# Patient Record
Sex: MALE | Race: BLACK OR AFRICAN AMERICAN | NOT HISPANIC OR LATINO | Employment: FULL TIME | URBAN - METROPOLITAN AREA
[De-identification: names, ages, dates, MRNs, and addresses within clinical notes are randomized per-mention and may not be internally consistent; named-entity substitution may affect disease eponyms.]

---

## 2020-10-08 ENCOUNTER — HOSPITAL ENCOUNTER (OUTPATIENT)
Facility: CLINIC | Age: 50
Setting detail: OBSERVATION
Discharge: HOME | End: 2020-10-09
Attending: EMERGENCY MEDICINE | Admitting: INTERNAL MEDICINE
Payer: COMMERCIAL

## 2020-10-08 DIAGNOSIS — I16.1 HYPERTENSIVE EMERGENCY: Primary | ICD-10-CM

## 2020-10-08 DIAGNOSIS — I16.0 HYPERTENSIVE URGENCY: ICD-10-CM

## 2020-10-08 DIAGNOSIS — I24.89 DEMAND ISCHEMIA (H): ICD-10-CM

## 2020-10-08 DIAGNOSIS — I21.4 NSTEMI (NON-ST ELEVATED MYOCARDIAL INFARCTION) (H): ICD-10-CM

## 2020-10-08 DIAGNOSIS — I51.7 LVH (LEFT VENTRICULAR HYPERTROPHY): ICD-10-CM

## 2020-10-08 DIAGNOSIS — F10.930 ALCOHOL WITHDRAWAL, UNCOMPLICATED (H): ICD-10-CM

## 2020-10-08 LAB
ALBUMIN SERPL-MCNC: 3.5 G/DL (ref 3.4–5)
ALP SERPL-CCNC: 88 U/L (ref 40–150)
ALT SERPL W P-5'-P-CCNC: 32 U/L (ref 0–70)
ANION GAP SERPL CALCULATED.3IONS-SCNC: 7 MMOL/L (ref 3–14)
AST SERPL W P-5'-P-CCNC: 28 U/L (ref 0–45)
BASOPHILS # BLD AUTO: 0 10E9/L (ref 0–0.2)
BASOPHILS NFR BLD AUTO: 0.2 %
BILIRUB SERPL-MCNC: 0.5 MG/DL (ref 0.2–1.3)
BUN SERPL-MCNC: 12 MG/DL (ref 7–30)
CALCIUM SERPL-MCNC: 8.7 MG/DL (ref 8.5–10.1)
CHLORIDE SERPL-SCNC: 104 MMOL/L (ref 94–109)
CO2 SERPL-SCNC: 24 MMOL/L (ref 20–32)
CREAT SERPL-MCNC: 1.19 MG/DL (ref 0.66–1.25)
DIFFERENTIAL METHOD BLD: NORMAL
EOSINOPHIL # BLD AUTO: 0 10E9/L (ref 0–0.7)
EOSINOPHIL NFR BLD AUTO: 0.6 %
ERYTHROCYTE [DISTWIDTH] IN BLOOD BY AUTOMATED COUNT: 12 % (ref 10–15)
GFR SERPL CREATININE-BSD FRML MDRD: 71 ML/MIN/{1.73_M2}
GLUCOSE SERPL-MCNC: 88 MG/DL (ref 70–99)
HCT VFR BLD AUTO: 41.7 % (ref 40–53)
HGB BLD-MCNC: 13.7 G/DL (ref 13.3–17.7)
IMM GRANULOCYTES # BLD: 0 10E9/L (ref 0–0.4)
IMM GRANULOCYTES NFR BLD: 0.2 %
LABORATORY COMMENT REPORT: ABNORMAL
LYMPHOCYTES # BLD AUTO: 1.4 10E9/L (ref 0.8–5.3)
LYMPHOCYTES NFR BLD AUTO: 26 %
MCH RBC QN AUTO: 29.7 PG (ref 26.5–33)
MCHC RBC AUTO-ENTMCNC: 32.9 G/DL (ref 31.5–36.5)
MCV RBC AUTO: 91 FL (ref 78–100)
MONOCYTES # BLD AUTO: 0.5 10E9/L (ref 0–1.3)
MONOCYTES NFR BLD AUTO: 9.4 %
NEUTROPHILS # BLD AUTO: 3.3 10E9/L (ref 1.6–8.3)
NEUTROPHILS NFR BLD AUTO: 63.6 %
NRBC # BLD AUTO: 0 10*3/UL
NRBC BLD AUTO-RTO: 0 /100
PLATELET # BLD AUTO: 236 10E9/L (ref 150–450)
POTASSIUM SERPL-SCNC: 3.4 MMOL/L (ref 3.4–5.3)
PROT SERPL-MCNC: 7.9 G/DL (ref 6.8–8.8)
RBC # BLD AUTO: 4.61 10E12/L (ref 4.4–5.9)
SARS-COV-2 RNA SPEC QL NAA+PROBE: NORMAL
SARS-COV-2 RNA SPEC QL NAA+PROBE: POSITIVE
SODIUM SERPL-SCNC: 135 MMOL/L (ref 133–144)
SPECIMEN SOURCE: ABNORMAL
SPECIMEN SOURCE: NORMAL
TROPONIN I SERPL-MCNC: 0.11 UG/L (ref 0–0.04)
TROPONIN I SERPL-MCNC: 0.12 UG/L (ref 0–0.04)
TROPONIN I SERPL-MCNC: 0.12 UG/L (ref 0–0.04)
TSH SERPL DL<=0.005 MIU/L-ACNC: 1.31 MU/L (ref 0.4–4)
WBC # BLD AUTO: 5.2 10E9/L (ref 4–11)

## 2020-10-08 PROCEDURE — 99220 PR INITIAL OBSERVATION CARE,LEVEL III: CPT | Performed by: INTERNAL MEDICINE

## 2020-10-08 PROCEDURE — 96376 TX/PRO/DX INJ SAME DRUG ADON: CPT

## 2020-10-08 PROCEDURE — 250N000013 HC RX MED GY IP 250 OP 250 PS 637: Performed by: EMERGENCY MEDICINE

## 2020-10-08 PROCEDURE — 93005 ELECTROCARDIOGRAM TRACING: CPT

## 2020-10-08 PROCEDURE — 80053 COMPREHEN METABOLIC PANEL: CPT | Performed by: EMERGENCY MEDICINE

## 2020-10-08 PROCEDURE — 85025 COMPLETE CBC W/AUTO DIFF WBC: CPT | Performed by: EMERGENCY MEDICINE

## 2020-10-08 PROCEDURE — G0378 HOSPITAL OBSERVATION PER HR: HCPCS

## 2020-10-08 PROCEDURE — C9803 HOPD COVID-19 SPEC COLLECT: HCPCS

## 2020-10-08 PROCEDURE — 84484 ASSAY OF TROPONIN QUANT: CPT | Mod: 91 | Performed by: INTERNAL MEDICINE

## 2020-10-08 PROCEDURE — 84484 ASSAY OF TROPONIN QUANT: CPT | Performed by: EMERGENCY MEDICINE

## 2020-10-08 PROCEDURE — 250N000011 HC RX IP 250 OP 636: Performed by: EMERGENCY MEDICINE

## 2020-10-08 PROCEDURE — 93005 ELECTROCARDIOGRAM TRACING: CPT | Mod: 76

## 2020-10-08 PROCEDURE — 99285 EMERGENCY DEPT VISIT HI MDM: CPT | Mod: 25

## 2020-10-08 PROCEDURE — 36415 COLL VENOUS BLD VENIPUNCTURE: CPT | Performed by: INTERNAL MEDICINE

## 2020-10-08 PROCEDURE — 250N000013 HC RX MED GY IP 250 OP 250 PS 637: Performed by: INTERNAL MEDICINE

## 2020-10-08 PROCEDURE — U0003 INFECTIOUS AGENT DETECTION BY NUCLEIC ACID (DNA OR RNA); SEVERE ACUTE RESPIRATORY SYNDROME CORONAVIRUS 2 (SARS-COV-2) (CORONAVIRUS DISEASE [COVID-19]), AMPLIFIED PROBE TECHNIQUE, MAKING USE OF HIGH THROUGHPUT TECHNOLOGIES AS DESCRIBED BY CMS-2020-01-R: HCPCS | Performed by: EMERGENCY MEDICINE

## 2020-10-08 PROCEDURE — 96374 THER/PROPH/DIAG INJ IV PUSH: CPT

## 2020-10-08 PROCEDURE — 84443 ASSAY THYROID STIM HORMONE: CPT | Performed by: EMERGENCY MEDICINE

## 2020-10-08 RX ORDER — HEPARIN SODIUM 10000 [USP'U]/100ML
12 INJECTION, SOLUTION INTRAVENOUS CONTINUOUS
Status: DISCONTINUED | OUTPATIENT
Start: 2020-10-08 | End: 2020-10-08

## 2020-10-08 RX ORDER — ASPIRIN 325 MG
325 TABLET ORAL ONCE
Status: COMPLETED | OUTPATIENT
Start: 2020-10-08 | End: 2020-10-08

## 2020-10-08 RX ORDER — ONDANSETRON 2 MG/ML
4 INJECTION INTRAMUSCULAR; INTRAVENOUS EVERY 6 HOURS PRN
Status: DISCONTINUED | OUTPATIENT
Start: 2020-10-08 | End: 2020-10-09 | Stop reason: HOSPADM

## 2020-10-08 RX ORDER — POLYETHYLENE GLYCOL 3350 17 G/17G
17 POWDER, FOR SOLUTION ORAL DAILY PRN
Status: DISCONTINUED | OUTPATIENT
Start: 2020-10-08 | End: 2020-10-09 | Stop reason: HOSPADM

## 2020-10-08 RX ORDER — ZOLPIDEM TARTRATE 5 MG/1
5 TABLET ORAL
Status: DISCONTINUED | OUTPATIENT
Start: 2020-10-08 | End: 2020-10-09 | Stop reason: HOSPADM

## 2020-10-08 RX ORDER — HYDRALAZINE HYDROCHLORIDE 20 MG/ML
10 INJECTION INTRAMUSCULAR; INTRAVENOUS ONCE
Status: COMPLETED | OUTPATIENT
Start: 2020-10-08 | End: 2020-10-08

## 2020-10-08 RX ORDER — HYDRALAZINE HYDROCHLORIDE 20 MG/ML
5 INJECTION INTRAMUSCULAR; INTRAVENOUS ONCE
Status: COMPLETED | OUTPATIENT
Start: 2020-10-08 | End: 2020-10-08

## 2020-10-08 RX ORDER — ACETAMINOPHEN 325 MG/1
650 TABLET ORAL EVERY 4 HOURS PRN
Status: DISCONTINUED | OUTPATIENT
Start: 2020-10-08 | End: 2020-10-09 | Stop reason: HOSPADM

## 2020-10-08 RX ORDER — LISINOPRIL 5 MG/1
5 TABLET ORAL 2 TIMES DAILY
Status: DISCONTINUED | OUTPATIENT
Start: 2020-10-08 | End: 2020-10-09 | Stop reason: HOSPADM

## 2020-10-08 RX ORDER — AMLODIPINE BESYLATE 5 MG/1
5 TABLET ORAL ONCE
Status: COMPLETED | OUTPATIENT
Start: 2020-10-08 | End: 2020-10-08

## 2020-10-08 RX ORDER — HYDRALAZINE HYDROCHLORIDE 20 MG/ML
10 INJECTION INTRAMUSCULAR; INTRAVENOUS ONCE
Status: DISCONTINUED | OUTPATIENT
Start: 2020-10-08 | End: 2020-10-08

## 2020-10-08 RX ORDER — HYDROCHLOROTHIAZIDE 12.5 MG/1
25 CAPSULE ORAL DAILY
Status: DISCONTINUED | OUTPATIENT
Start: 2020-10-08 | End: 2020-10-09 | Stop reason: HOSPADM

## 2020-10-08 RX ORDER — ACETAMINOPHEN 650 MG/1
650 SUPPOSITORY RECTAL EVERY 4 HOURS PRN
Status: DISCONTINUED | OUTPATIENT
Start: 2020-10-08 | End: 2020-10-09 | Stop reason: HOSPADM

## 2020-10-08 RX ORDER — NALOXONE HYDROCHLORIDE 0.4 MG/ML
.1-.4 INJECTION, SOLUTION INTRAMUSCULAR; INTRAVENOUS; SUBCUTANEOUS
Status: DISCONTINUED | OUTPATIENT
Start: 2020-10-08 | End: 2020-10-09 | Stop reason: HOSPADM

## 2020-10-08 RX ORDER — ONDANSETRON 4 MG/1
4 TABLET, ORALLY DISINTEGRATING ORAL EVERY 6 HOURS PRN
Status: DISCONTINUED | OUTPATIENT
Start: 2020-10-08 | End: 2020-10-09 | Stop reason: HOSPADM

## 2020-10-08 RX ADMIN — HYDRALAZINE HYDROCHLORIDE 5 MG: 20 INJECTION INTRAMUSCULAR; INTRAVENOUS at 18:43

## 2020-10-08 RX ADMIN — HYDRALAZINE HYDROCHLORIDE 10 MG: 20 INJECTION INTRAMUSCULAR; INTRAVENOUS at 18:03

## 2020-10-08 RX ADMIN — HYDROCHLOROTHIAZIDE 25 MG: 12.5 CAPSULE ORAL at 20:57

## 2020-10-08 RX ADMIN — LISINOPRIL 5 MG: 5 TABLET ORAL at 20:57

## 2020-10-08 RX ADMIN — ASPIRIN 325 MG ORAL TABLET 325 MG: 325 PILL ORAL at 18:03

## 2020-10-08 RX ADMIN — AMLODIPINE BESYLATE 5 MG: 5 TABLET ORAL at 17:20

## 2020-10-08 ASSESSMENT — ENCOUNTER SYMPTOMS
WEAKNESS: 0
BLOOD IN STOOL: 0
SHORTNESS OF BREATH: 0
NERVOUS/ANXIOUS: 1
NUMBNESS: 0

## 2020-10-08 ASSESSMENT — MIFFLIN-ST. JEOR: SCORE: 1807.91

## 2020-10-08 NOTE — ED PROVIDER NOTES
"  History     Chief Complaint:  Hypertension    HPI   Jamel Jasso is a 50 year old male who presents with hypertension. The patient states that he was getting a routine colonoscopy today at [facility] when his blood pressure was taken and he realized that it was unusually high. After his colonoscopy, he states that he presented her immediately for evaluation. In addition, he reports anxiety. He denies any blood in his stool, chest pain, shortness of breath, numbness, weakness, syncope, or any other symptoms.     Cardiac/PE/DVT Risk Factors:  The patient has no history of hypertension, hyperlipidemia, or diabetes. He reports a family history of hypertension. The patient denies any personal or familial history of PE, DVT, or clotting disorder. The patient reports no recent travel, surgery, or other immobilizations.    Allergies:  Penicillins    Medications:    The patient is not currently taking any prescribed medications.    Past Medical History:    History reviewed.  No pertinent past medical history.    Past Surgical History:    History reviewed. No pertinent surgical history.    Family History:    Alcohol/drug abuse - father, mother  Depression - mother  Hepatitis C - mother  Hypertension - mother    Social History:  Smoking status: Former - 6/9/2020  Alcohol use: Yes  Drug use: No  PCP: [facility]  Marital Status:   [2]     Review of Systems   Respiratory: Negative for shortness of breath.    Cardiovascular: Negative for chest pain.        Hypertension - positive   Gastrointestinal: Negative for blood in stool.   Neurological: Negative for syncope, weakness and numbness.   Psychiatric/Behavioral: The patient is nervous/anxious.    All other systems reviewed and are negative.    Physical Exam     Patient Vitals for the past 24 hrs:   BP Temp Temp src Pulse Resp SpO2 Height Weight   10/08/20 2039 (!) 193/106 96.5  F (35.8  C) Oral 101 18 98 % 1.88 m (6' 2\") 87.8 kg (193 lb 9.6 " "oz)   10/08/20 1945 (!) 190/140 -- -- 92 -- 99 % -- --   10/08/20 1930 (!) 186/132 -- -- 94 -- 98 % -- --   10/08/20 1915 (!) 207/130 -- -- 92 -- 98 % -- --   10/08/20 1850 (!) 199/127 -- -- 79 -- 98 % -- --   10/08/20 1845 (!) 208/130 -- -- 79 -- 98 % -- --   10/08/20 1843 (!) 208/130 -- -- -- -- -- -- --   10/08/20 1840 (!) 200/135 -- -- 82 -- 99 % -- --   10/08/20 1835 -- -- -- 79 -- 99 % -- --   10/08/20 1830 (!) 202/133 -- -- 75 -- 97 % -- --   10/08/20 1825 (!) 206/138 -- -- 75 -- 100 % -- --   10/08/20 1822 -- -- -- -- -- -- 1.88 m (6' 2\") --   10/08/20 1820 (!) 216/140 -- -- 73 -- 99 % -- --   10/08/20 1817 -- -- -- -- -- -- -- 88.7 kg (195 lb 8.8 oz)   10/08/20 1815 -- -- -- 72 -- 92 % -- --   10/08/20 1810 (!) 203/144 -- -- 71 -- 96 % -- --   10/08/20 1805 (!) 220/143 -- -- 75 -- 99 % -- --   10/08/20 1803 (!) 195/132 -- -- -- -- -- -- --   10/08/20 1800 -- -- -- 67 -- 100 % -- --   10/08/20 1755 (!) 195/132 -- -- 67 -- 98 % -- --   10/08/20 1750 -- -- -- 64 -- 97 % -- --   10/08/20 1745 -- -- -- 64 -- 94 % -- --   10/08/20 1740 (!) 192/147 -- -- 67 -- 98 % -- --   10/08/20 1735 -- -- -- 66 -- 96 % -- --   10/08/20 1730 -- -- -- 69 -- 97 % -- --   10/08/20 1725 (!) 190/139 -- -- 68 -- 98 % -- --   10/08/20 1720 -- -- -- 70 -- 97 % -- --   10/08/20 1715 (!) 191/137 -- -- 67 -- 96 % -- --   10/08/20 1710 (!) 191/137 -- -- 71 -- 99 % -- --   10/08/20 1705 -- -- -- 71 -- 99 % -- --   10/08/20 1700 -- -- -- 70 -- 95 % -- --   10/08/20 1655 (!) 192/136 -- -- 81 -- 100 % -- --   10/08/20 1650 (!) 201/134 -- -- 68 -- 99 % -- --   10/08/20 1615 (!) 192/129 97.4  F (36.3  C) Temporal 77 20 100 % -- --       Physical Exam  General: Alert, appears well-developed and well-nourished. Cooperative.     In mild distress, anxious  HEENT:  Head:  Atraumatic  Ears:  External ears are normal  Mouth/Throat:  Oropharynx is without erythema or exudate and mucous membranes are moist   Eyes:   Conjunctivae normal and EOM are " normal. No scleral icterus.  CV:  Normal rate, regular rhythm, normal heart sounds and radial pulses are 2+ and symmetric.  No murmur.  Resp:  Breath sounds are clear bilaterally    Non-labored, no retractions or accessory muscle use  GI:  Abdomen is soft, no distension, no tenderness. No rebound or guarding.  No CVA tenderness bilaterally  MS:  Normal range of motion. No edema.    Normal strength in all 4 extremities.     Back atraumatic.    No midline cervical, thoracic, or lumbar tenderness  Skin:  Warm and dry.  No rash or lesions noted.  Neuro: Alert. Normal strength.  GCS: 15  Psych:  Normal mood and affect.    Emergency Department Course     ECG:  Indication: Hypertension  Time: 1644  Vent. Rate 69 bpm. MA interval 212. QRS duration 84. QT/QTc 408/437. P-R-T axis 34 7 90.  Sinus rhythm with 1st degree AV block. Voltage criteria for left ventricular hypertrophy. T wave abnormality, consider lateral ischemia. Abnormal ECG. Read time: 1647     Indication: Hypertension   Time: 1937  Vent. Rate 86 bpm. MA interval 222. QRS duration 80. QT/QTc 368/440. P-R-T axis 57 8 98.  Sinus rhythm with 1st degree AV block. Left ventricular hypertrophy with repolarization abnormality. Abnormal ECG. No significant change compared to EKG dated 10/8/2020. Read time: 1941    Laboratory:  Laboratory findings were communicated with the patient who voiced understanding of the findings.    CBC: AWNL. (WBC 5.2, HGB 13.7, )     CMP: AWNL (Creatinine: 1.19)    1655 Troponin: 0.110    TSH with free T4 reflex: 1.31    COVID-19 Virus PCR: Pending    Interventions:  1720 Norvasc 5 mg PO  1803 Aspirin 325 mg PO  1803 Apresoline 10 mg PO  1843 Apresoline 5 mg PO    Emergency Department Course:  Past medical records, nursing notes, and vitals reviewed.    1641 I performed an exam of the patient as documented above.     EKG obtained in the ED, see results above.     IV was inserted and blood was drawn for laboratory testing, results  above.    1755 I rechecked the patient and discussed the results of his workup thus far.     1842  I consulted with Dr. Gu of the hospitalist services. They are in agreement to accept the patient for admission.    Findings and plan explained to the Patient who consents to admission. Discussed the patient with Dr. Gu, who will admit the patient to an Observation bed for further monitoring, evaluation, and treatment.    I personally reviewed the laboratory results with the Patient and answered all related questions prior to admission.     Impression & Plan   Medical Decision Making:  Jamel Jasso is a 50 year old male who presents asymptomatic with high blood pressure. Patient had a colonoscopy this morning and noted in his paperwork the he had a high blood pressure reading, leading him to present to the emergency department. Patient's blood work is concerning for troponin elevation of 0.110. He has no known prior cardiac history. I did review an EKG from 2013 at an outside clinic and compared to his EKG today. He does have a more prominent J-point elevation and potential T wave abnormalities in the lateral leads concerning for likely LVH, although cannot fully exclude potential new ischemic changes. With the elevation of the troponin in the setting of severe uncontrolled hypertension, we will treat the patient for hypertensive emergency in setting of LVH. I discussed the case with Dr. Gu of the hospitalist services and he preferred to hold on heparin treatment at this time as higher suspicion for LVH and accelerated HTN as cause of troponin leak, particularly with an asymptomatic patient.  We will hold on heparin for now and plan to continue treatment of hypertensive emergency with IV medications as need be.  We will admit for repeat troponin studies as well as ECHO in the morning. Admitted to Dr. Gu under observation status.    Covid-19  Jamel Jasso was evaluated during a global COVID-19 pandemic,  which necessitated consideration that the patient might be at risk for infection with the SARS-CoV-2 virus that causes COVID-19.   Applicable protocols for evaluation were followed during the patient's care.   COVID-19 was considered as part of the patient's evaluation. The plan for testing is:  a test was obtained during this visit.    Diagnosis:    ICD-10-CM    1. Hypertensive emergency  I16.1    2. NSTEMI (non-ST elevated myocardial infarction) (H)  I21.4 Asymptomatic COVID-19 Virus (Coronavirus) by PCR     Troponin I     SARS-CoV-2 COVID-19 Virus (Coronavirus) RT-PCR     SARS-CoV-2 COVID-19 Virus (Coronavirus) RT-PCR Nasopharyngeal     Troponin I   3. LVH (left ventricular hypertrophy)  I51.7    4. Demand ischemia (H)  I24.8        Disposition:  Admitted to Dr. Gu.    Scribe Disclosure:  Rommel MARIO, am serving as a scribe at 4:40 PM on 10/8/2020 to document services personally performed by Aroldo Aguirre MD based on my observations and the provider's statements to me.        Aroldo Aguirre MD  10/08/20 [id number]

## 2020-10-08 NOTE — ED TRIAGE NOTES
Patient states was sent from doctor for high blood pressure - patient had a colonoscopy and BP was 200/ 130. Patient states he does not have a history of high BP. ABC intact alert and no distress.

## 2020-10-09 ENCOUNTER — APPOINTMENT (OUTPATIENT)
Dept: CARDIOLOGY | Facility: CLINIC | Age: 50
End: 2020-10-09
Attending: INTERNAL MEDICINE
Payer: COMMERCIAL

## 2020-10-09 VITALS
HEIGHT: 74 IN | BODY MASS INDEX: 24.85 KG/M2 | WEIGHT: 193.6 LBS | HEART RATE: 70 BPM | TEMPERATURE: 97.7 F | SYSTOLIC BLOOD PRESSURE: 158 MMHG | RESPIRATION RATE: 16 BRPM | OXYGEN SATURATION: 99 % | DIASTOLIC BLOOD PRESSURE: 98 MMHG

## 2020-10-09 LAB
INTERPRETATION ECG - MUSE: NORMAL
INTERPRETATION ECG - MUSE: NORMAL
TROPONIN I SERPL-MCNC: 0.13 UG/L (ref 0–0.04)

## 2020-10-09 PROCEDURE — G0378 HOSPITAL OBSERVATION PER HR: HCPCS

## 2020-10-09 PROCEDURE — 93306 TTE W/DOPPLER COMPLETE: CPT

## 2020-10-09 PROCEDURE — 250N000013 HC RX MED GY IP 250 OP 250 PS 637: Performed by: PHYSICIAN ASSISTANT

## 2020-10-09 PROCEDURE — 99217 PR OBSERVATION CARE DISCHARGE: CPT | Performed by: PHYSICIAN ASSISTANT

## 2020-10-09 PROCEDURE — 84484 ASSAY OF TROPONIN QUANT: CPT | Performed by: INTERNAL MEDICINE

## 2020-10-09 PROCEDURE — 93306 TTE W/DOPPLER COMPLETE: CPT | Mod: 26 | Performed by: INTERNAL MEDICINE

## 2020-10-09 PROCEDURE — 36415 COLL VENOUS BLD VENIPUNCTURE: CPT | Performed by: INTERNAL MEDICINE

## 2020-10-09 PROCEDURE — 250N000013 HC RX MED GY IP 250 OP 250 PS 637: Performed by: INTERNAL MEDICINE

## 2020-10-09 RX ORDER — FOLIC ACID 1 MG/1
1 TABLET ORAL DAILY
Qty: 30 TABLET | Refills: 0 | Status: SHIPPED | OUTPATIENT
Start: 2020-10-09 | End: 2021-09-23

## 2020-10-09 RX ORDER — LANOLIN ALCOHOL/MO/W.PET/CERES
100 CREAM (GRAM) TOPICAL 3 TIMES DAILY
Status: DISCONTINUED | OUTPATIENT
Start: 2020-10-11 | End: 2020-10-09 | Stop reason: HOSPADM

## 2020-10-09 RX ORDER — LANOLIN ALCOHOL/MO/W.PET/CERES
100 CREAM (GRAM) TOPICAL DAILY
Qty: 30 TABLET | Refills: 0 | Status: SHIPPED | OUTPATIENT
Start: 2020-10-17 | End: 2021-09-23

## 2020-10-09 RX ORDER — LORAZEPAM 2 MG/ML
1-2 INJECTION INTRAMUSCULAR EVERY 30 MIN PRN
Status: DISCONTINUED | OUTPATIENT
Start: 2020-10-09 | End: 2020-10-09 | Stop reason: HOSPADM

## 2020-10-09 RX ORDER — LANOLIN ALCOHOL/MO/W.PET/CERES
100 CREAM (GRAM) TOPICAL DAILY
Status: DISCONTINUED | OUTPATIENT
Start: 2020-10-17 | End: 2020-10-09 | Stop reason: HOSPADM

## 2020-10-09 RX ORDER — FOLIC ACID 1 MG/1
1 TABLET ORAL DAILY
Status: DISCONTINUED | OUTPATIENT
Start: 2020-10-09 | End: 2020-10-09 | Stop reason: HOSPADM

## 2020-10-09 RX ORDER — MULTIPLE VITAMINS W/ MINERALS TAB 9MG-400MCG
1 TAB ORAL DAILY
Status: DISCONTINUED | OUTPATIENT
Start: 2020-10-09 | End: 2020-10-09

## 2020-10-09 RX ORDER — LANOLIN ALCOHOL/MO/W.PET/CERES
200 CREAM (GRAM) TOPICAL 3 TIMES DAILY
Status: DISCONTINUED | OUTPATIENT
Start: 2020-10-09 | End: 2020-10-09 | Stop reason: HOSPADM

## 2020-10-09 RX ORDER — AMLODIPINE BESYLATE 10 MG/1
10 TABLET ORAL DAILY
Qty: 30 TABLET | Refills: 0 | Status: SHIPPED | OUTPATIENT
Start: 2020-10-09 | End: 2021-09-23

## 2020-10-09 RX ORDER — LORAZEPAM 1 MG/1
1-2 TABLET ORAL EVERY 30 MIN PRN
Status: DISCONTINUED | OUTPATIENT
Start: 2020-10-09 | End: 2020-10-09 | Stop reason: HOSPADM

## 2020-10-09 RX ORDER — MULTIPLE VITAMINS W/ MINERALS TAB 9MG-400MCG
1 TAB ORAL DAILY
Qty: 30 TABLET | Refills: 0 | Status: SHIPPED | OUTPATIENT
Start: 2020-10-09 | End: 2021-09-23

## 2020-10-09 RX ORDER — GABAPENTIN 300 MG/1
900 CAPSULE ORAL EVERY 8 HOURS
Status: DISCONTINUED | OUTPATIENT
Start: 2020-10-09 | End: 2020-10-09 | Stop reason: HOSPADM

## 2020-10-09 RX ORDER — FLUMAZENIL 0.1 MG/ML
0.2 INJECTION, SOLUTION INTRAVENOUS
Status: DISCONTINUED | OUTPATIENT
Start: 2020-10-09 | End: 2020-10-09 | Stop reason: HOSPADM

## 2020-10-09 RX ORDER — HYDRALAZINE HYDROCHLORIDE 10 MG/1
10 TABLET, FILM COATED ORAL EVERY 8 HOURS PRN
Status: DISCONTINUED | OUTPATIENT
Start: 2020-10-09 | End: 2020-10-09

## 2020-10-09 RX ORDER — METOPROLOL SUCCINATE 25 MG/1
12.5 TABLET, EXTENDED RELEASE ORAL DAILY
Qty: 30 TABLET | Refills: 0 | Status: CANCELLED | OUTPATIENT
Start: 2020-10-10

## 2020-10-09 RX ORDER — LISINOPRIL 5 MG/1
5 TABLET ORAL 2 TIMES DAILY
Qty: 60 TABLET | Refills: 0 | Status: CANCELLED | OUTPATIENT
Start: 2020-10-09 | End: 2020-11-08

## 2020-10-09 RX ORDER — GABAPENTIN 300 MG/1
600 CAPSULE ORAL EVERY 8 HOURS
Status: DISCONTINUED | OUTPATIENT
Start: 2020-10-12 | End: 2020-10-09 | Stop reason: HOSPADM

## 2020-10-09 RX ORDER — FOLIC ACID 1 MG/1
1 TABLET ORAL DAILY
Status: DISCONTINUED | OUTPATIENT
Start: 2020-10-09 | End: 2020-10-09

## 2020-10-09 RX ORDER — LANOLIN ALCOHOL/MO/W.PET/CERES
100 CREAM (GRAM) TOPICAL DAILY
Status: DISCONTINUED | OUTPATIENT
Start: 2020-10-17 | End: 2020-10-09

## 2020-10-09 RX ORDER — HYDRALAZINE HYDROCHLORIDE 10 MG/1
10 TABLET, FILM COATED ORAL EVERY 8 HOURS PRN
Status: DISCONTINUED | OUTPATIENT
Start: 2020-10-09 | End: 2020-10-09 | Stop reason: HOSPADM

## 2020-10-09 RX ORDER — LISINOPRIL 10 MG/1
20 TABLET ORAL DAILY
Qty: 60 TABLET | Refills: 0 | Status: SHIPPED | OUTPATIENT
Start: 2020-10-09 | End: 2021-09-23

## 2020-10-09 RX ORDER — LISINOPRIL 10 MG/1
10 TABLET ORAL ONCE
Status: COMPLETED | OUTPATIENT
Start: 2020-10-09 | End: 2020-10-09

## 2020-10-09 RX ORDER — GABAPENTIN 600 MG/1
1200 TABLET ORAL ONCE
Status: COMPLETED | OUTPATIENT
Start: 2020-10-09 | End: 2020-10-09

## 2020-10-09 RX ORDER — GABAPENTIN 300 MG/1
300 CAPSULE ORAL EVERY 8 HOURS
Status: DISCONTINUED | OUTPATIENT
Start: 2020-10-14 | End: 2020-10-09 | Stop reason: HOSPADM

## 2020-10-09 RX ORDER — LANOLIN ALCOHOL/MO/W.PET/CERES
200 CREAM (GRAM) TOPICAL 3 TIMES DAILY
Status: DISCONTINUED | OUTPATIENT
Start: 2020-10-09 | End: 2020-10-09

## 2020-10-09 RX ORDER — HYDROCHLOROTHIAZIDE 12.5 MG/1
25 CAPSULE ORAL DAILY
Qty: 60 CAPSULE | Refills: 0 | Status: CANCELLED | OUTPATIENT
Start: 2020-10-10 | End: 2020-11-09

## 2020-10-09 RX ORDER — LANOLIN ALCOHOL/MO/W.PET/CERES
100 CREAM (GRAM) TOPICAL 3 TIMES DAILY
Status: DISCONTINUED | OUTPATIENT
Start: 2020-10-11 | End: 2020-10-09

## 2020-10-09 RX ORDER — GABAPENTIN 100 MG/1
100 CAPSULE ORAL EVERY 8 HOURS
Status: DISCONTINUED | OUTPATIENT
Start: 2020-10-16 | End: 2020-10-09 | Stop reason: HOSPADM

## 2020-10-09 RX ORDER — MULTIPLE VITAMINS W/ MINERALS TAB 9MG-400MCG
1 TAB ORAL DAILY
Status: DISCONTINUED | OUTPATIENT
Start: 2020-10-09 | End: 2020-10-09 | Stop reason: HOSPADM

## 2020-10-09 RX ADMIN — LISINOPRIL 10 MG: 10 TABLET ORAL at 15:58

## 2020-10-09 RX ADMIN — FOLIC ACID 1 MG: 1 TABLET ORAL at 13:25

## 2020-10-09 RX ADMIN — MULTIPLE VITAMINS W/ MINERALS TAB 1 TABLET: TAB at 13:25

## 2020-10-09 RX ADMIN — HYDROCHLOROTHIAZIDE 25 MG: 12.5 CAPSULE ORAL at 09:46

## 2020-10-09 RX ADMIN — GABAPENTIN 1200 MG: 600 TABLET, FILM COATED ORAL at 13:24

## 2020-10-09 RX ADMIN — Medication 200 MG: at 13:29

## 2020-10-09 RX ADMIN — METOPROLOL SUCCINATE 12.5 MG: 25 TABLET, EXTENDED RELEASE ORAL at 09:46

## 2020-10-09 RX ADMIN — THIAMINE HCL TAB 100 MG 200 MG: 100 TAB at 13:24

## 2020-10-09 RX ADMIN — HYDRALAZINE HYDROCHLORIDE 10 MG: 10 TABLET, FILM COATED ORAL at 15:58

## 2020-10-09 RX ADMIN — LISINOPRIL 5 MG: 5 TABLET ORAL at 09:46

## 2020-10-09 NOTE — PLAN OF CARE
PRIMARY DIAGNOSIS: Hypertension, NSTEMI  OUTPATIENT/OBSERVATION GOALS TO BE MET BEFORE DISCHARGE:  1. Ruled out acute coronary syndrome (negative or stable Troponin):   Trops trending up 0.11->0.12->0.123>0.127  2. Pain Status: Pain free.  3. Appropriate provocative testing performed: To be done in AM  - Stress Test Procedure: Echo-result pending  - Interpretation of cardiac rhythm per telemetry tech: SR in 70s    4. Cleared by Consultants (if applicable):N/A  5. Return to near baseline physical activity: Yes  Discharge Planner Nurse   Safe discharge environment identified: Yes  Barriers to discharge: Yes       Entered by: Karly Michelle 10/09/2020     Vitals are Temp: 97.7  F (36.5  C) Temp src: Oral BP: (!) 158/98 Pulse: 70   Resp: 16 SpO2: 99 %.  Patient is AxOx4. Up independently in room. Special precautions maintained for positive COVID-19 result. On a 2 gm sodium, no caffeine diet. Denies pain. PIV SL. Denies dizziness, Sob, and nausea. CMS intact. Plan- tele, trend trops, ECHO-completed. Lisinopril and Hydrochlorothiazide started for HTN. Stable and resting in bed. Will continue to monitor and provide supportive cares.      Please review provider order for any additional goals. Nurse to notify provider when observation goals have been met and patient is ready for discharge.

## 2020-10-09 NOTE — DISCHARGE SUMMARY
[facility]    Discharge Summary  Hospitalist    Date of Admission:  10/8/2020  Date of Discharge:  10/9/2020  6:13 PM  Discharging Provider: Betty Mcdaniels PA-C  Date of Service (when I saw the patient): 10/09/20    Discharge Diagnoses   Hypertensive Urgency  Alcohol Withdrawal  Tobacco use  Asymptomatic COVID 19 PCR Positive status   Detectable Troponin    History of Present Illness   Jamel Jasso is an 50 year old male with Pmhx of HTN,, tobacco dependence, who presented with quite elevated with systolic blood pressure of 206 after an outpatient routine colonoscopy. He presented to the ED for evaluation of blood pressure in the absence of symptoms other than anxiety related to his colonoscopy earlier in the day.   The patient normally exercises frequently and this includes bicycling, swimming, and weightlifting.  He does not have any chest pressure, shortness of breath or chest pain when he is doing exercise or activity.  He has no cardiac history beyond a diagnosis of hypertension previously.   On arrival to the ER, vital signs included blood pressure 192/136 with a heart rate of 80.  No fever.  Saturation 100% on room air.    Workup in the ER included labs and EKG.  EKG showed a sinus rhythm with J-point elevation in the leads V2 through V3 consistent with previous EKG.  He also had some T-wave inversions in the lateral leads V5 and V6 that was not seen on previous EKG from 2013.  Lab work included a troponin elevation of his detectable at 0.1, serial troponin's without dramatic peak and fall. Consistent with demand ischemia in the setting of hypertensive urgency with uncontrolled HTN. TSH is normal.  CBC and complete metabolic panel are all normal. COVID 19 PCR testing returned positive on 10/8 in the absence of symptoms or work up that would demonstrate acute illness.   The patient's blood pressures gradually lowered to an improve range with initiated of dual medical therapy with CCB,  "ACEI. TTE 10/9 did not show RWMA but was notable for moderate LVH. Later on in observation stay, he was noted to demonstrate mild alcohol withdrawal symptoms especially in light of abrupt cessation for his planned colonoscopy, and discussion with the patient and his daughter about his prior daily etoh use. His withdrawal symptoms were mild, he declined outpatient resources for cessation during hospitalization. However with his echocardiogram findings would suspect that he likely has uncontrolled HTN despite the secondary contribution of alcohol withdrawal.   He was doing well, with no chest pain, dyspnea, cough or fevers. Discharged with return precautions, recommendations for close primary care follow up, and cardaic stress evaluation once quarantine completed.       Please see admitting H & P  by Aroldo Gu MD, on 10/8/2020 for full details of the encounter.       Pending Results   None.    Code Status   Full Code       Primary Care Physician   [facility]      BP (!) 170/100 (BP Location: Right arm)   Pulse 77   Temp 97.6  F (36.4  C) (Oral)   Resp 16   Ht 1.88 m (6' 2\")   Wt 87.8 kg (193 lb 9.6 oz)   SpO2 98%   BMI 24.86 kg/m      Constitutional: Awake, alert, no apparent distress  Respiratory:  Normal work of breathing. Lungs clear to auscultation bilaterally, no crackles or wheezing.  Cardiovascular: Regular rate and rhythm, normal S1 and S2, and no murmur appreciated.   GI: Bowel sounds present. soft, non-distended, non-tender.   Skin/Integument: Warm, dry. no peripheral edema.  Neuro: No focal deficits. Moving all extremities with normal strength. Coordination and sensation grossly intact. Speech clear.   Psych: Appropriate affect.        Discharge Disposition   Discharged to home  Condition at discharge: Stable    Consultations This Hospital Stay   PHARMACY TO DOSE HEPARIN    Time Spent on this Encounter   Betty MARIO PA-C, personally saw the patient today and spent " greater than 30 minutes discharging this patient.    Discharge Orders      Discharge Order: F/U with Cardiac  JUN      Reason for your hospital stay    You were hospitalized for observation high blood pressure.     Follow-up and recommended labs and tests     Follow up with primary care provider, [facility], within 7 days to evaluate medication change and for hospital follow- up.  The following labs/tests are recommended: BMP.     Activity    Your activity upon discharge: activity as tolerated     Reason for your hospital stay    You were hospitalized for evaluation of high blood pressure after colonoscopy. Found to be positive for COVID 19 infection, however asymptomatic.     Follow-up and recommended labs and tests     Follow up with primary care provider, [facility], within 7 days to evaluate medication change and for hospital follow- up.  The following labs/tests are recommended: BMP.    Follow up with stress test (echocardiogram), ordered with [facility].     Activity    Your activity upon discharge: activity as tolerated     When to contact your care team    Call your primary care doctor if you have any of the following: temperature greater than 101 F, worsening shortness of breath, increased swelling, worsening pain, new or unrelenting diarrhea, or any other concerning symptoms. Call 911 or go to the emergency room if you need immediate assistance.     When to contact your care team    When to seek medical advice  Call your healthcare provider right away if any of these occur:    Blood pressure reaches a systolic (top number) of 180 or higher or diastolic (bottom number) of 110 or higher, despite blood pressure medication    Chest, arm, shoulder, neck, or upper back pain    Shortness of breath    Severe headache    Throbbing or rushing sound in the ears    Nosebleed    Extreme drowsiness, confusion, or fainting    Dizziness or dizziness with spinning sensation  (vertigo)    Weakness in an arm or leg or on one side of the face    Trouble speaking or seeing      Echo Stress Echocardiogram    Administration of IV contrast will be tailored to this examination per the appropriate written protocol listed in the Echocardiography department Protocol Book, or by the supervising Cardiologist. This may result in an order change.    Use of contrast is at the discretion of the supervising Cardiologist.     Diet    Follow this diet upon discharge: Regular     Diet    Follow this diet upon discharge: Regular     Discharge Medications   Discharge Medication List as of 10/9/2020  5:09 PM      START taking these medications    Details   amLODIPine (NORVASC) 10 MG tablet Take 1 tablet (10 mg) by mouth daily Hold for SBP <120, Disp-30 tablet, R-0, E-Prescribe      folic acid (FOLVITE) 1 MG tablet Take 1 tablet (1 mg) by mouth daily, Disp-30 tablet, R-0, E-Prescribe      lisinopril (ZESTRIL) 10 MG tablet Take 2 tablets (20 mg) by mouth daily Hold for SBP <125, Disp-60 tablet, R-0, E-Prescribe      multivitamin w/minerals (THERA-VIT-M) tablet Take 1 tablet by mouth daily, Disp-30 tablet, R-0, E-Prescribe      thiamine (B-1) 100 MG tablet Take 1 tablet (100 mg) by mouth daily, Disp-30 tablet, R-0, E-Prescribe           Allergies   No Known Allergies  Data   Most Recent 3 CBC's:  Recent Labs   Lab Test 10/08/20  1655   WBC 5.2   HGB 13.7   MCV 91         Most Recent 3 BMP's:  Recent Labs   Lab Test 10/08/20  1655      POTASSIUM 3.4   CHLORIDE 104   CO2 24   BUN 12   CR 1.19   ANIONGAP 7   ELDA 8.7   GLC 88     Most Recent 2 LFT's:  Recent Labs   Lab Test 10/08/20  1655   AST 28   ALT 32   ALKPHOS 88   BILITOTAL 0.5       Most Recent 3 Troponin's:  Recent Labs   Lab Test 10/09/20  0651 10/08/20  2144 10/08/20  1942   TROPI 0.127* 0.123* 0.120*   Most Recent TSH, T4 and A1c Labs:  Recent Labs   Lab Test 10/08/20  1655   TSH 1.31     Results for orders placed or performed during the  hospital encounter of 10/08/20   Echocardiogram Complete    Narrative    [id number]  [id number]  [id number]  [id number]^TRISTIAN^LORENA^[facility]  Echocardiography Laboratory  [address] [place] [address]        Name: RANDY HOLLOWAY  MRN: [id number]  : 1970  Study Date: 10/09/2020 07:50 AM  Age: 50 yrs  Gender: Male  Patient Location: [facility]  Reason For Study: Abn EKG  Ordering Physician: LORENA LUBIN  Referring Physician: Sobeida [place]  Performed By: Oral Vizcarra RDCS     BSA: 2.1 m2  Height: 74 in  Weight: 195 lb  HR: 68  BP: 193/106 mmHg  _____________________________________________________________________________  __        Procedure  Complete Portable Echo Adult.  _____________________________________________________________________________  __        Interpretation Summary     There is moderate to severe concentric left ventricular hypertrophy.  The visual ejection fraction is estimated at 55-60%.  There is trace to mild aortic regurgitation.  _____________________________________________________________________________  __        Left Ventricle  The left ventricle is normal in size. There is moderate to severe concentric  left ventricular hypertrophy. The visual ejection fraction is estimated at 55-  60%. Left ventricular diastolic function is indeterminate.     Right Ventricle  The right ventricle is normal in structure, function and size.     Atria  Normal left atrial size. Right atrial size is normal.     Mitral Valve  The mitral valve is normal in structure and function.        Tricuspid Valve  Normal tricuspid valve.     Aortic Valve  There is trivial trileaflet aortic sclerosis. There is trace to mild aortic  regurgitation.     Pulmonic Valve  Normal pulmonic valve.     Vessels  The aortic root is normal size. Normal size ascending aorta. The inferior vena  cava is normal.     Pericardium  There is no pericardial effusion.      _____________________________________________________________________________  __  MMode/2D Measurements & Calculations  IVSd: 1.6 cm  LVIDd: 4.5 cm  LVIDs: 2.8 cm  LVPWd: 1.8 cm  FS: 37.8 %  LV mass(C)d: 327.0 grams  LV mass(C)dI: 152.1 grams/m2     Ao root diam: 3.7 cm  LA dimension: 4.9 cm  asc Aorta Diam: 3.5 cm  LA/Ao: 1.3  LVOT diam: 2.3 cm  LVOT area: 4.0 cm2  LA Volume (BP): 66.0 ml  LA Volume Index (BP): 30.7 ml/m2  RWT: 0.77        Doppler Measurements & Calculations  MV E max jaydon: 58.7 cm/sec  MV A max jaydon: 55.3 cm/sec  MV E/A: 1.1  MV max P.5 mmHg  MV mean P.5 mmHg  MV V2 VTI: 21.5 cm  MVA(VTI): 3.2 cm2     MV dec time: 0.27 sec  AI P1/2t: 533.7 msec  LV V1 max PG: 3.2 mmHg  LV V1 max: 88.8 cm/sec  LV V1 VTI: 17.2 cm  CO(LVOT): 4.7 l/min  CI(LVOT): 2.2 l/min/m2  SV(LVOT): 68.6 ml  SI(LVOT): 31.9 ml/m2  PA acc time: 0.11 sec  E/E' av.0  Lateral E/e': 11.8  Medial E/e': 10.2           _____________________________________________________________________________  __           Report approved by: Allison Hastings 10/09/2020 02:30 PM            Betty Mcdaniels PA-C  [facility]

## 2020-10-09 NOTE — PLAN OF CARE
PRIMARY DIAGNOSIS: Hypertension, NSTEMI  OUTPATIENT/OBSERVATION GOALS TO BE MET BEFORE DISCHARGE:  1. Ruled out acute coronary syndrome (negative or stable Troponin):   Trops trending up 0.11->0.12->0.123  2. Pain Status: Pain free.  3. Appropriate provocative testing performed: To be done in AM  - Stress Test Procedure: Echo  - Interpretation of cardiac rhythm per telemetry tech: SR in 70s    4. Cleared by Consultants (if applicable):N/A  5. Return to near baseline physical activity: Yes  Discharge Planner Nurse   Safe discharge environment identified: Yes  Barriers to discharge: Yes       Entered by: Karly Michelle 10/09/2020 6:08 PM    Vitals are Temp: 97.7  F (36.5  C) Temp src: Oral BP: (!) 158/98 Pulse: 70   Resp: 16 SpO2: 99 %.  Patient is AxOx4. Up independently in room. Special precautions maintained for positive COVID-19 result. On a 2 gm sodium, no caffeine diet. Denies pain. PIV SL. Denies dizziness, Sob, and nausea. CMS intact. Plan- tele, trend trops, ECHO. Lisinopril and Hydrochlorothiazide started for HTN. Stable and resting in bed. Will continue to monitor and provide supportive cares.       Please review provider order for any additional goals.   Nurse to notify provider when observation goals have been met and patient is ready for discharge.

## 2020-10-09 NOTE — PLAN OF CARE
ROOM # 207    Living Situation (if not independent, order SW consult): home alone  Facility name:  : Jeannie, daughter  Daniela Santillan, significant other    Activity level at baseline: Ind  Activity level on admit: Ind      Patient registered to observation; given Patient Bill of Rights; given the opportunity to ask questions about observation status and their plan of care.  Patient has been oriented to the observation room, bathroom and call light is in place.    Discussed discharge goals and expectations with patient/family.

## 2020-10-09 NOTE — H&P
Admitted:     10/08/2020      CHIEF COMPLAINT:  Elevated blood pressure after colonoscopy.      HISTORY OF PRESENT ILLNESS:  Mr. Jasso is a 50-year-old male with no significant medical history beyond previously diagnosed hypertension.  He is currently not treated for high blood pressure.  The patient was undergoing a screening colonoscopy today.  He completed his colonoscopy prep yesterday and had no issues with that.  However, today during his colonoscopy, his blood pressure was checked and found to be quite elevated with a systolic blood pressure of 206.  The patient completed a colonoscopy without any issues.  Two polyps were removed.  He felt well following the procedure and had no symptoms.  However, when he got his discharge paperwork from his colonoscopy, he noticed that his blood pressure was quite elevated with systolic blood pressure of 206.  He decided to come to the ER for evaluation in light of that elevated blood pressure.  Again, the patient had no symptoms.      The patient normally exercises frequently and this includes bicycling, swimming, and weightlifting.  He does not have any chest pressure, shortness of breath or chest pain when he is doing exercise or activity.  He has no cardiac history beyond a diagnosis of hypertension previously.      The patient does report that he was quite nervous and anxious prior to his colonoscopy today.      On arrival to the ER, vital signs included blood pressure 192/136 with a heart rate of 80.  No fever.  Saturation 100% on room air.      Workup in the ER included labs and EKG.  EKG on my review showed a sinus rhythm with J-point elevation in the leads V2 through V3 consistent with previous EKG.  He also had some T-wave inversions in the lateral leads V5 and V6 that was not seen on previous EKG from 2013.      Lab work included a troponin elevation of his detectable at 0.1.  TSH is normal.  CBC and complete metabolic panel are all normal.  Asymptomatic COVID-19  testing is pending.      I spoke with Dr. Aguirre of the ER.  Plan at this point is admission to observation for hypertension and serial troponin enzymes.      PAST MEDICAL HISTORY:  History of elevated blood pressure, untreated currently.      CURRENT PRESCRIPTION:  None .      FAMILY HISTORY:  Hypertension runs in the family on his mother's side.  He does not know about his father's medical history.      SOCIAL HISTORY:  The patient uses alcohol socially.  He reports that he smokes cigarettes, but is attempting to quit and has cut down significantly on his cigarette use; however, he still continues to smoke an occasional cigarette.      REVIEW OF SYSTEMS:  See HPI for details.  Comprehensive greater than 10-point review of systems is otherwise negative besides that detailed above.      PHYSICAL EXAMINATION:   VITAL SIGNS:  Blood pressure is currently 207/130 with a heart rate of 90.  No fever.  Saturation 98% on room air.   GENERAL:  The patient appears anxious.  He is asking appropriate questions.  He appears to understand the situation well.  His fiadrianae is present at bedside and his daughter on the phone and I am communicating with all of them while seeing the patient.   HEENT:  Head is atraumatic.  Sclerae are white.  Eyelids are normal.  Conjunctivae are normal.  Extraocular movements are intact.   NECK:  Supple.  No cervical or supraclavicular lymphadenopathy.   HEART:  Regular rate and rhythm.  No significant murmurs.  No lower extremity edema.   LUNGS:  Clear to auscultation bilaterally.  No intercostal retractions.  No conversational dyspnea.   ABDOMEN:  Nontender, nondistended.  Soft, no masses.  No organomegaly.   EXTREMITIES:  No edema.   SKIN:  Reveals no rashes.  No jaundice.  Skin is dry to touch.   NEUROLOGIC:  Cranial nerves II-XII are intact.  Moves all extremities appropriately.  Sensation intact to light touch in the upper and lower extremities bilaterally.   PSYCHIATRIC:  The patient is awake,  alert and oriented x3.  Appears anxious.      LABORATORY AND IMAGING DATA:  Reviewed above in HPI.  EKG personally reviewed as above.      IMPRESSION AND PLAN:  Mr. Jasso is a 50-year-old male with a past medical history notable for elevated blood pressure, currently untreated.  The patient was undergoing a screening colonoscopy today.   After the procedure, he looked in his paperwork and noticed that they had recorded a blood pressure quite elevated with systolic blood pressure of 206.  This concerned him and he came to the ER for evaluation.  He had no symptoms.      Evaluation in the ER included a slightly detectable troponin enzyme.  EKG was also done showing J point elevation consistent with previous EKG and slight T-wave inversion in the lateral leads, which is new compared to prior EKG from 7 years ago.  ER providers request admission to the hospital.   1.  Accelerated hypertension.   2.  History of hypertension, currently untreated.   3.  Minimal troponin elevation, suspect type 2 myocardial infarction due to demand ischemia in the setting of hypertension today.   4.  EKG changes, possibly indicative of left ventricular hypertrophy related to uncontrolled hypertension.      PLAN:   1.  Admit to observation.   2.  Needs blood pressure management with medications.  We will start Hydrochlorothiazide 25 mg daily and Lisinopril 5 mg twice a day, titrated as needed for better blood pressure control.   3.  Check echocardiogram tomorrow.  If any wall motion abnormalities present, would consider further evaluation by Cardiology  I would not be surprised, however, to find left ventricular hypertrophy on the echocardiogram due to uncontrolled hypertension.   4.  Serial troponin enzymes.  If troponins begin to elevate significantly, would consider Cardiology involvement, but I doubt acute coronary syndrome here and I suspect the troponin elevation is simply due to demand ischemia as mentioned above.   7.  Telemetry  monitoring.   8.  Probable discharge tomorrow assuming troponins remain flat, echocardiogram shows no significant findings, and blood pressure is better controlled.      RECOMMENDATIONS:   1.  I have advised complete smoking cessation.   2.  I have advised close followup with primary care provider for further titration of blood pressure medications and better hypertensive management.         LORENA LUBIN MD             D: 10/08/2020   T: 10/08/2020   MT:       Name:     RANDY HOLLOWAY   MRN:      [id number]        Account:      [id number]   :      1970        Admitted:     10/08/2020                   Document: [id number]

## 2020-10-09 NOTE — PLAN OF CARE
PRIMARY DIAGNOSIS: Hypertension, NSTEMI  OUTPATIENT/OBSERVATION GOALS TO BE MET BEFORE DISCHARGE:  1. Ruled out acute coronary syndrome (negative or stable Troponin):   Trops trending up 0.11->0.12->0.123  2. Pain Status: Pain free.  3. Appropriate provocative testing performed: To be done in AM  - Stress Test Procedure: Echo  - Interpretation of cardiac rhythm per telemetry tech: SR/ST    4. Cleared by Consultants (if applicable):N/A  5. Return to near baseline physical activity: Yes  Discharge Planner Nurse   Safe discharge environment identified: Yes  Barriers to discharge: Yes       Entered by: Marcos Rodriguez 10/09/2020 12:41 AM    Vitals are Temp: 98.9  F (37.2  C) Temp src: Oral BP: (!) 175/101 Pulse: 98   Resp: 20 SpO2: 99 %.  Patient is AxOx4. Up independently in room. Special precautions initiated for positive COVID-19 result. On a 2 gm sodium, no caffeine diet. Turkey sandwich lunch box given on admission. Denies pain. PIV SL, patent and flushed well. Denies dizziness, Sob, and nausea. CMS intact. Plan- tele, trend trops, ECHO. Lisinopril and Hydrochlorothiazide started for HTN. Will continue to monitor.        Please review provider order for any additional goals.   Nurse to notify provider when observation goals have been met and patient is ready for discharge.

## 2020-10-09 NOTE — PLAN OF CARE
PRIMARY DIAGNOSIS: Hypertension, NSTEMI  OUTPATIENT/OBSERVATION GOALS TO BE MET BEFORE DISCHARGE:  1. Ruled out acute coronary syndrome (negative or stable Troponin):   Trops trending up 0.11->0.12->0.123  2. Pain Status: Pain free.  3. Appropriate provocative testing performed: To be done in AM  - Stress Test Procedure: Echo  - Interpretation of cardiac rhythm per telemetry tech: SR in 70s    4. Cleared by Consultants (if applicable):N/A  5. Return to near baseline physical activity: Yes  Discharge Planner Nurse   Safe discharge environment identified: Yes  Barriers to discharge: Yes       Entered by: Marcos Rodriguez 10/09/2020 5:37 AM    Vitals are Temp: 97  F (36.1  C) Temp src: Oral BP: (!) 168/105 Pulse: 76   Resp: 18 SpO2: 98 %.  Patient is AxOx4. Up independently in room. Special precautions maintained for positive COVID-19 result. On a 2 gm sodium, no caffeine diet. Denies pain. PIV SL. Denies dizziness, Sob, and nausea. CMS intact. Plan- tele, trend trops, ECHO. Lisinopril and Hydrochlorothiazide started for HTN. Stable and resting in bed. Will continue to monitor and provide supportive cares.       Please review provider order for any additional goals.   Nurse to notify provider when observation goals have been met and patient is ready for discharge.

## 2020-10-09 NOTE — DISCHARGE INSTRUCTIONS
"Discharge Instructions for COVID-19 Patients  You have--or may have--COVID-19. Please follow the instructions listed below.   If you have a weakened immune system, discuss with your doctor any other actions you need to take.  How can I protect others?  If you have symptoms (fever, cough, body aches or trouble breathing):    Stay home and away from others (self-isolate) until:  ? At least 10 days have passed since your symptoms started, And   ? You've had no fever--and no medicine that reduces fever--for 1 full day (24 hours), And    ? Your other symptoms have resolved (gotten better).  If you don't show symptoms, but testing showed that you have COVID-19:    Stay home and away from others (self-isolate). Follow the tips under \"How do I self-isolate?\" below for 10 days (20 days if you have a weak immune system).    You don't need to be retested for COVID-19 before going back to school or work. As long as you're fever-free and feeling better, you can go back to school, work and other activities after waiting the 10 or 20 days.   How do I self-isolate?    Stay in your own room, even for meals. Use your own bathroom if you can.    Stay away from others in your home. No hugging, kissing or shaking hands. No visitors.    Don't go to work, school or anywhere else.    Clean \"high touch\" surfaces often (doorknobs, counters, handles). Use household cleaning spray or wipes. You'll find a full list of  on the EPA website: www.epa.gov/pesticide-registration/list-n-disinfectants-use-against-sars-cov-2.    Cover your mouth and nose with a mask or other face covering to avoid spreading germs.    Wash your hands and face often. Use soap and water.    Caregivers in these groups are at risk for severe illness due to COVID-19:  ? People 65 years and older  ? People who live in a nursing home or long-term care facility  ? People with chronic disease (lung, heart, cancer, diabetes, kidney, liver, immunologic)  ? People who have a " weakened immune system, including those who:    Are in cancer treatment    Take medicine that weakens the immune system, such as corticosteroids    Had a bone marrow or organ transplant    Have an immune deficiency    Have poorly controlled HIV or AIDS    Are obese (body mass index of 40 or higher)    Smoke regularly    Caregivers should wear gloves while washing dishes, handling laundry and cleaning bedrooms and bathrooms.    Use caution when washing and drying laundry: Don't shake dirty laundry and use the warmest water setting that you can.    For more tips on managing your health at home, go to www.cdc.gov/coronavirus/2019-ncov/downloads/10Things.pdf.  How can I take care of myself at home?  1. Get lots of rest. Drink extra fluids (unless a doctor has told you not to).    2. Take Tylenol (acetaminophen) for fever or pain. If you have liver or kidney problems, ask your family doctor if it's okay to take Tylenol.     Adults can take either:  ? 650 mg (two 325 mg pills) every 4 to 6 hours, or   ? 1,000 mg (two 500 mg pills) every 8 hours as needed.  ? Note: Don't take more than 3,000 mg in one day. Acetaminophen is found in many medicines (both prescribed and over-the-counter medicines). Read all labels to be sure you don't take too much.   For children, check the Tylenol bottle for the right dose. The dose is based on the child's age or weight.  3. If you have other health problems (like cancer, heart failure, an organ transplant or severe kidney disease): Call your specialty clinic if you don't feel better in the next 2 days.    4. Know when to call 911. Emergency warning signs include:  ? Trouble breathing or shortness of breath  ? Pain or pressure in the chest that doesn't go away  ? Feeling confused like you haven't felt before, or not being able to wake up  ? Bluish-colored lips or face    5. Your doctor may have prescribed a blood thinner medicine. Follow their instructions.  Where can I get more  information?    [facility] - About COVID-19: [email or web address]    CDC - What to Do If You're Sick: www.cdc.gov/coronavirus/2019-ncov/about/steps-when-sick.html    CDC - Ending Home Isolation: www.cdc.gov/coronavirus/2019-ncov/hcp/disposition-in-home-patients.html    CDC - Caring for Someone: www.cdc.gov/coronavirus/2019-ncov/if-you-are-sick/care-for-someone.html    [facility] - Interim Guidance for Hospital Discharge to Home: www.health.[facility].[place]./diseases/coronavirus/hcp/hospdischarge.pdf    [facility] clinical trials (COVID-19 research studies): clinicalaffairs.[facility]-clinical-trials    Below are the COVID-19 hotlines at the [place] Department of [facility] ([facility]). Interpreters are available.  ? For health questions: Call 678-747-4328 or 1-527.878.2381 (7 a.m. to 7 p.m.)  ? For questions about schools and childcare: Call 791-267-6227 or 1-837.919.7479 (7 a.m. to 7 p.m.)    For informational purposes only. Not to replace the advice of your health care provider. Clinically reviewed by the Infection Prevention Team. Copyright   2020 [employer]. All rights reserved. [employer] [id number] - REV 08/04/20.

## 2020-10-09 NOTE — PLAN OF CARE
PRIMARY DIAGNOSIS: Hypertension, NSTEMI  OUTPATIENT/OBSERVATION GOALS TO BE MET BEFORE DISCHARGE:  1. Ruled out acute coronary syndrome (negative or stable Troponin):   Trops trending up 0.11->0.12->0.123>0.127  2. Pain Status: Pain free.  3. Appropriate provocative testing performed: To be done in AM  - Stress Test Procedure: Echo-result pending  - Interpretation of cardiac rhythm per telemetry tech: SR in 70s    4. Cleared by Consultants (if applicable):N/A  5. Return to near baseline physical activity: Yes  Discharge Planner Nurse   Safe discharge environment identified: Yes  Barriers to discharge: Yes       Entered by: Karly Michelle 10/09/2020     Vitals are Temp: 97.7  F (36.5  C) Temp src: Oral BP: (!) 158/98 Pulse: 70   Resp: 16 SpO2: 99 %.  Patient is AxOx4. Up independently in room. Special precautions maintained for positive COVID-19 result. On a 2 gm sodium, no caffeine diet. Denies pain. PIV SL. Denies dizziness, Sob, and nausea. CMS intact. Plan- tele, trend trops, ECHO-result in chart. Lisinopril and Hydrochlorothiazide started for HTN-BP coming down. Stable and resting in bed. Will continue to monitor and provide supportive cares.      Please review provider order for any additional goals. Nurse to notify provider when observation goals have been met and patient is ready for discharge.

## 2020-10-09 NOTE — PHARMACY-ADMISSION MEDICATION HISTORY
Medication Reconciliation is completed.  Patient is currently not taking any medications prior to this admission per phone call into inpatient room.                   October 8, 2020                    Param Kessler RPH

## 2020-10-09 NOTE — PLAN OF CARE
Patient's After Visit Summary was reviewed with patient.   Patient verbalized understanding of After Visit Summary, recommended follow up and was given an opportunity to ask questions.   Discharge medications sent home with patient/family: YES   Discharged with spouse.    OBSERVATION patient END time: 6:12 PM

## 2020-10-09 NOTE — ED NOTES
[facility]  ED Nurse Handoff Report    Jamel Jasso is a 50 year old male   ED Chief complaint: Hypertension  . ED Diagnosis:   Final diagnoses:   NSTEMI (non-ST elevated myocardial infarction) (H)   Hypertensive emergency     Allergies: No Known Allergies    Code Status: Full Code  Activity level - Baseline/Home:  Independent. Activity Level - Current:   Independent. Lift room needed: No. Bariatric: No   Needed: No   Isolation: No. Infection: Not Applicable.     Vital Signs:   Vitals:    10/08/20 1843 10/08/20 1845 10/08/20 1850 10/08/20 1915   BP: (!) 208/130 (!) 208/130 (!) 199/127 (!) 207/130   Pulse:  79 79 92   Resp:       Temp:       TempSrc:       SpO2:  98% 98% 98%   Weight:       Height:           Cardiac Rhythm:  ,      Pain level: 0-10 Pain Scale: 0  Patient confused: No. Patient Falls Risk: No.   Elimination Status: Has voided   Patient Report - Initial Complaint: Patient states was sent from doctor for high blood pressure - patient had a colonoscopy and BP was 200/ 130. Patient states he does not have a history of high BP. ABC intact alert and no distress.. Focused Assessment: meds, labs   Tests Performed:   No orders to display     Labs Ordered and Resulted from Time of ED Arrival Up to the Time of Departure from the ED   TROPONIN I - Abnormal; Notable for the following components:       Result Value    Troponin I ES 0.110 (*)     All other components within normal limits   CBC WITH PLATELETS DIFFERENTIAL   COMPREHENSIVE METABOLIC PANEL   TSH WITH FREE T4 REFLEX   COVID-19 VIRUS (CORONAVIRUS) BY PCR   MEASURE WEIGHT   NOTIFY PHYSICIAN   NOTIFY PHYSICIAN   . Abnormal Results: as noted.   Treatments provided:   No orders to display     No orders to display   Family Comments: na  OBS brochure/video discussed/provided to patient:  Yes  ED Medications:   Medications   hydrALAZINE (APRESOLINE) injection 10 mg (10 mg Intravenous Not Given 10/8/20 1847)   amLODIPine (NORVASC) tablet 5  mg (5 mg Oral Given 10/8/20 1720)   aspirin (ASA) tablet 325 mg (325 mg Oral Given 10/8/20 1803)   hydrALAZINE (APRESOLINE) injection 10 mg (10 mg Intravenous Given 10/8/20 1803)   hydrALAZINE (APRESOLINE) injection 5 mg (5 mg Intravenous Given 10/8/20 1843)     Drips infusing:  No  For the majority of the shift, the patient's behavior Green. Interventions performed were na.    Sepsis treatment initiated: No     Patient tested for COVID 19 prior to admission: YES    ED Nurse Name/Phone Number: Thi Herrmann RN,   7:29 PM    RECEIVING UNIT ED HANDOFF REVIEW    Above ED Nurse Handoff Report was reviewed: Yes  Reviewed by: Marcos Rodriguez on October 8, 2020 at 7:42 PM

## 2020-10-21 ENCOUNTER — HOSPITAL ENCOUNTER (EMERGENCY)
Facility: CLINIC | Age: 50
Discharge: HOME | End: 2020-10-21
Attending: EMERGENCY MEDICINE
Payer: COMMERCIAL

## 2020-10-21 VITALS
RESPIRATION RATE: 16 BRPM | SYSTOLIC BLOOD PRESSURE: 175 MMHG | DIASTOLIC BLOOD PRESSURE: 115 MMHG | OXYGEN SATURATION: 100 % | HEART RATE: 72 BPM | TEMPERATURE: 98.5 F

## 2020-10-21 NOTE — ED TRIAGE NOTES
Here for several BP medication refills. Hypertensive in triage 175/115. Recently Covid (+)  A&O x 4, ABCs intact.

## 2020-10-21 NOTE — ED PROVIDER NOTES
Patient not seen by provider.  Patient came out of room and told RN that he had an appointment that he had to be at and was discharged AMA by RN.       Romero Howard MD  10/21/20 5970

## 2020-10-21 NOTE — ED NOTES
Patient states he has an appointment at 1230 and is unable to wait for MD or meds.  Will follow up another time.  AMA form signed.

## 2021-09-23 ENCOUNTER — TELEPHONE (OUTPATIENT)
Dept: FAMILY MEDICINE | Facility: CLINIC | Age: 51
End: 2021-09-23

## 2021-09-23 ENCOUNTER — OFFICE VISIT (OUTPATIENT)
Dept: FAMILY MEDICINE | Facility: CLINIC | Age: 51
End: 2021-09-23
Payer: COMMERCIAL

## 2021-09-23 VITALS
TEMPERATURE: 97.9 F | SYSTOLIC BLOOD PRESSURE: 182 MMHG | BODY MASS INDEX: 26.5 KG/M2 | OXYGEN SATURATION: 99 % | DIASTOLIC BLOOD PRESSURE: 122 MMHG | HEART RATE: 76 BPM | WEIGHT: 206.38 LBS

## 2021-09-23 DIAGNOSIS — I51.7 LVH (LEFT VENTRICULAR HYPERTROPHY): ICD-10-CM

## 2021-09-23 DIAGNOSIS — I21.4 NSTEMI (NON-ST ELEVATED MYOCARDIAL INFARCTION) (H): Primary | ICD-10-CM

## 2021-09-23 DIAGNOSIS — I10 ESSENTIAL HYPERTENSION: ICD-10-CM

## 2021-09-23 DIAGNOSIS — E78.2 MIXED HYPERLIPIDEMIA: ICD-10-CM

## 2021-09-23 DIAGNOSIS — I21.4 NSTEMI (NON-ST ELEVATED MYOCARDIAL INFARCTION) (H): ICD-10-CM

## 2021-09-23 DIAGNOSIS — M16.0 BILATERAL HIP JOINT ARTHRITIS: ICD-10-CM

## 2021-09-23 DIAGNOSIS — Z12.5 SCREENING FOR PROSTATE CANCER: ICD-10-CM

## 2021-09-23 DIAGNOSIS — Z00.00 ROUTINE GENERAL MEDICAL EXAMINATION AT A HEALTH CARE FACILITY: Primary | ICD-10-CM

## 2021-09-23 PROBLEM — D12.6 ADENOMATOUS POLYP OF COLON: Status: ACTIVE | Noted: 2020-10-20

## 2021-09-23 LAB
ANION GAP SERPL CALCULATED.3IONS-SCNC: 3 MMOL/L (ref 3–14)
BUN SERPL-MCNC: 21 MG/DL (ref 7–30)
CALCIUM SERPL-MCNC: 9.2 MG/DL (ref 8.5–10.1)
CHLORIDE BLD-SCNC: 104 MMOL/L (ref 94–109)
CHOLEST SERPL-MCNC: 234 MG/DL
CO2 SERPL-SCNC: 29 MMOL/L (ref 20–32)
CREAT SERPL-MCNC: 1.05 MG/DL (ref 0.66–1.25)
FASTING STATUS PATIENT QL REPORTED: YES
GFR SERPL CREATININE-BSD FRML MDRD: 82 ML/MIN/1.73M2
GLUCOSE BLD-MCNC: 102 MG/DL (ref 70–99)
HDLC SERPL-MCNC: 50 MG/DL
LDLC SERPL CALC-MCNC: 164 MG/DL
NONHDLC SERPL-MCNC: 184 MG/DL
POTASSIUM BLD-SCNC: 3.7 MMOL/L (ref 3.4–5.3)
PSA SERPL-MCNC: 0.74 UG/L (ref 0–4)
SODIUM SERPL-SCNC: 136 MMOL/L (ref 133–144)
TRIGL SERPL-MCNC: 98 MG/DL

## 2021-09-23 PROCEDURE — G0103 PSA SCREENING: HCPCS | Performed by: PHYSICIAN ASSISTANT

## 2021-09-23 PROCEDURE — 80048 BASIC METABOLIC PNL TOTAL CA: CPT | Performed by: PHYSICIAN ASSISTANT

## 2021-09-23 PROCEDURE — 80061 LIPID PANEL: CPT | Performed by: PHYSICIAN ASSISTANT

## 2021-09-23 PROCEDURE — 99213 OFFICE O/P EST LOW 20 MIN: CPT | Mod: 25 | Performed by: PHYSICIAN ASSISTANT

## 2021-09-23 PROCEDURE — 36415 COLL VENOUS BLD VENIPUNCTURE: CPT | Performed by: PHYSICIAN ASSISTANT

## 2021-09-23 PROCEDURE — 99386 PREV VISIT NEW AGE 40-64: CPT | Performed by: PHYSICIAN ASSISTANT

## 2021-09-23 RX ORDER — LISINOPRIL 40 MG/1
40 TABLET ORAL DAILY
Qty: 90 TABLET | Refills: 1 | Status: SHIPPED | OUTPATIENT
Start: 2021-09-23 | End: 2021-11-29

## 2021-09-23 RX ORDER — CHLORTHALIDONE 25 MG/1
25 TABLET ORAL DAILY
Qty: 90 TABLET | Refills: 3 | Status: SHIPPED | OUTPATIENT
Start: 2021-09-23 | End: 2021-11-29

## 2021-09-23 RX ORDER — MULTIPLE VITAMINS W/ MINERALS TAB 9MG-400MCG
TAB ORAL
COMMUNITY
Start: 2021-09-14

## 2021-09-23 RX ORDER — AMLODIPINE BESYLATE 10 MG/1
10 TABLET ORAL DAILY
Qty: 90 TABLET | Refills: 3 | Status: SHIPPED | OUTPATIENT
Start: 2021-09-23 | End: 2021-11-29

## 2021-09-23 RX ORDER — AMLODIPINE BESYLATE 10 MG/1
10 TABLET ORAL
COMMUNITY
Start: 2020-12-11 | End: 2021-12-10

## 2021-09-23 RX ORDER — CHLORTHALIDONE 25 MG/1
25 TABLET ORAL
COMMUNITY
Start: 2021-01-14 | End: 2021-09-23

## 2021-09-23 ASSESSMENT — ANXIETY QUESTIONNAIRES
GAD7 TOTAL SCORE: 0
GAD7 TOTAL SCORE: 0
5. BEING SO RESTLESS THAT IT IS HARD TO SIT STILL: NOT AT ALL
6. BECOMING EASILY ANNOYED OR IRRITABLE: NOT AT ALL
7. FEELING AFRAID AS IF SOMETHING AWFUL MIGHT HAPPEN: NOT AT ALL
7. FEELING AFRAID AS IF SOMETHING AWFUL MIGHT HAPPEN: NOT AT ALL
1. FEELING NERVOUS, ANXIOUS, OR ON EDGE: NOT AT ALL
GAD7 TOTAL SCORE: 0
4. TROUBLE RELAXING: NOT AT ALL
2. NOT BEING ABLE TO STOP OR CONTROL WORRYING: NOT AT ALL
8. IF YOU CHECKED OFF ANY PROBLEMS, HOW DIFFICULT HAVE THESE MADE IT FOR YOU TO DO YOUR WORK, TAKE CARE OF THINGS AT HOME, OR GET ALONG WITH OTHER PEOPLE?: NOT DIFFICULT AT ALL
3. WORRYING TOO MUCH ABOUT DIFFERENT THINGS: NOT AT ALL

## 2021-09-23 ASSESSMENT — ENCOUNTER SYMPTOMS
JOINT SWELLING: 0
FEVER: 0
HEMATOCHEZIA: 0
DYSURIA: 0
ARTHRALGIAS: 1
HEADACHES: 0
PALPITATIONS: 0
SORE THROAT: 0
CHILLS: 0
COUGH: 0
PARESTHESIAS: 0
HEARTBURN: 0
NAUSEA: 0
NERVOUS/ANXIOUS: 0
FREQUENCY: 0
CONSTIPATION: 0
EYE PAIN: 0
SHORTNESS OF BREATH: 0
ABDOMINAL PAIN: 0
WEAKNESS: 0
HEMATURIA: 0
MYALGIAS: 1
DIARRHEA: 0
DIZZINESS: 0

## 2021-09-23 ASSESSMENT — PATIENT HEALTH QUESTIONNAIRE - PHQ9
SUM OF ALL RESPONSES TO PHQ QUESTIONS 1-9: 0
10. IF YOU CHECKED OFF ANY PROBLEMS, HOW DIFFICULT HAVE THESE PROBLEMS MADE IT FOR YOU TO DO YOUR WORK, TAKE CARE OF THINGS AT HOME, OR GET ALONG WITH OTHER PEOPLE: NOT DIFFICULT AT ALL
SUM OF ALL RESPONSES TO PHQ QUESTIONS 1-9: 0

## 2021-09-23 NOTE — TELEPHONE ENCOUNTER
Called pt, mail box full, try later cannot LM, see triage phone encounter, get pharmacy info  LUL Perales, Tesfaye Smith, PAKO  P Kevin Triage  Please call patient. His labs have returned and his cholesterol is quite high. I know he has made some good changes in life style and diet with his wife, but based on this, not only do I recommend continuing these, but because of his blood pressure history, I would recommend starting a medication for this as well. Its called lipitor and this would be taken once daily. If patient agrees, I can send this to his pharmacy.     Prostate is normal.     Bmp is normal. Glucose is only slightly high, but not concerning.     -gary rao, pac

## 2021-09-23 NOTE — PROGRESS NOTES
SUBJECTIVE:   CC: Jamel Jasso is an 51 year old male who presents for preventative health visit.       Patient has been advised of split billing requirements and indicates understanding: Yes  Healthy Habits:     Getting at least 3 servings of Calcium per day:  Yes    Bi-annual eye exam:  NO    Dental care twice a year:  NO    Sleep apnea or symptoms of sleep apnea:  None    Diet:  Low salt    Frequency of exercise:  1 day/week    Duration of exercise:  15-30 minutes    Taking medications regularly:  No    Medication side effects:  None    PHQ-2 Total Score: 0    Additional concerns today:  Yes      Hypertension Follow-up      Do you check your blood pressure regularly outside of the clinic? Yes     Are you following a low salt diet? Yes    Are your blood pressures ever more than 140 on the top number (systolic) OR more   than 90 on the bottom number (diastolic), for example 140/90? No     Has not taken medication yet today. Home bps are typical 130s/80s on his current regimen. Tolerates well without side effects.     Vascular Disease Follow-up      How often do you take nitroglycerin? Never    Do you take an aspirin every day? No     In summary, patient suffered htn emergency in oct of 2020 and ekg shows nonspecific changes with LVH no echo. Stress echo was recommended outpatient, but patient has yet to get this. No chest pains or shortness of breath.       Today's PHQ-2 Score:   PHQ-2 ( 1999 [employer]izer) 9/23/2021   Q1: Little interest or pleasure in doing things 0   Q2: Feeling down, depressed or hopeless 0   PHQ-2 Score 0   Q1: Little interest or pleasure in doing things Not at all   Q2: Feeling down, depressed or hopeless Not at all   PHQ-2 Score 0       Abuse: Current or Past(Physical, Sexual or Emotional)- No  Do you feel safe in your environment? Yes    Have you ever done Advance Care Planning? (For example, a Health Directive, POLST, or a discussion with a medical provider or your loved ones about your wishes):  No, advance care planning information given to patient to review.  Patient plans to discuss their wishes with loved ones or provider.      Social History     Tobacco Use     Smoking status: Never Smoker     Smokeless tobacco: Never Used   Substance Use Topics     Alcohol use: Yes     If you drink alcohol do you typically have >3 drinks per day or >7 drinks per week? No    Alcohol Use 9/23/2021   Prescreen: >3 drinks/day or >7 drinks/week? Not Applicable   Prescreen: >3 drinks/day or >7 drinks/week? -   No flowsheet data found.    Last PSA:   Prostate Specific Antigen Screen   Date Value Ref Range Status   09/23/2021 0.74 0.00 - 4.00 ug/L Final       Reviewed orders with patient. Reviewed health maintenance and updated orders accordingly - Yes  BP Readings from Last 3 Encounters:   09/23/21 (!) 182/122   10/21/20 (!) 175/115   10/09/20 (!) 158/98    Wt Readings from Last 3 Encounters:   09/23/21 93.6 kg (206 lb 6 oz)   10/08/20 87.8 kg (193 lb 9.6 oz)                  Patient Active Problem List   Diagnosis     NSTEMI (non-ST elevated myocardial infarction) (H)     Hypertensive emergency     Hypertensive urgency     LVH (left ventricular hypertrophy)     Adenomatous polyp of colon     Essential hypertension     History reviewed. No pertinent surgical history.    Social History     Tobacco Use     Smoking status: Never Smoker     Smokeless tobacco: Never Used   Substance Use Topics     Alcohol use: Yes     Family History   Family history unknown: Yes         Current Outpatient Medications   Medication Sig Dispense Refill     amLODIPine (NORVASC) 10 MG tablet Take 10 mg by mouth       amLODIPine (NORVASC) 10 MG tablet Take 1 tablet (10 mg) by mouth daily Hold for SBP <120 90 tablet 3     chlorthalidone (HYGROTON) 25 MG tablet Take 1 tablet (25 mg) by mouth daily 90 tablet 3     lisinopril (ZESTRIL) 40 MG tablet Take 1 tablet (40 mg) by mouth daily 90 tablet 1     multivitamin w/minerals (CERTAVITE/ANTIOXIDANTS) tablet  TAKE ONE TABLET BY MOUTH ONCE DAILY       Allergies   Allergen Reactions     Shellfish Allergy Anaphylaxis     Bicillin C-R,      Other reaction(s): *Unknown     Penicillins      Recent Labs   Lab Test 09/23/21  0935 10/08/20  1655   *  --    HDL 50  --    TRIG 98  --    ALT  --  32   CR 1.05 1.19   GFRESTIMATED 82 71   GFRESTBLACK  --  82   POTASSIUM 3.7 3.4   TSH  --  1.31        Reviewed and updated as needed this visit by clinical staff  Tobacco  Allergies    Med Hx  Surg Hx  Fam Hx  Soc Hx        Reviewed and updated as needed this visit by Provider                History reviewed. No pertinent past medical history.   History reviewed. No pertinent surgical history.      History of right hip arthritis. States was told he needed a hip replacement and needs to consult with a surgeon.     Review of Systems   Constitutional: Negative for chills and fever.   HENT: Negative for congestion, ear pain, hearing loss and sore throat.    Eyes: Negative for pain and visual disturbance.   Respiratory: Negative for cough and shortness of breath.    Cardiovascular: Negative for chest pain, palpitations and peripheral edema.   Gastrointestinal: Negative for abdominal pain, constipation, diarrhea, heartburn, hematochezia and nausea.   Genitourinary: Negative for discharge, dysuria, frequency, genital sores, hematuria, impotence and urgency.   Musculoskeletal: Positive for arthralgias and myalgias. Negative for joint swelling.   Skin: Negative for rash.   Neurological: Negative for dizziness, weakness, headaches and paresthesias.   Psychiatric/Behavioral: Negative for mood changes. The patient is not nervous/anxious.        OBJECTIVE:   BP (!) 182/122 (BP Location: Left arm, Patient Position: Chair, Cuff Size: Adult Large)   Pulse 76   Temp 97.9  F (36.6  C) (Oral)   Wt 93.6 kg (206 lb 6 oz)   SpO2 99%   BMI 26.50 kg/m      Physical Exam  GENERAL: healthy, alert and no distress  EYES: Eyes grossly normal to  inspection, PERRL and conjunctivae and sclerae normal  HENT: ear canals and TM's normal, nose and mouth without ulcers or lesions  NECK: no adenopathy, no asymmetry, masses, or scars and thyroid normal to palpation  RESP: lungs clear to auscultation - no rales, rhonchi or wheezes  CV: regular rate and rhythm, normal S1 S2, no S3 or S4, no murmur, click or rub, no peripheral edema and peripheral pulses strong  ABDOMEN: soft, nontender, no hepatosplenomegaly, no masses and bowel sounds normal  MS: no gross musculoskeletal defects noted, no edema  SKIN: no suspicious lesions or rashes  NEURO: Normal strength and tone, mentation intact and speech normal  PSYCH: mentation appears normal, affect normal/bright  LYMPH: no cervical adenopathy    Diagnostic Test Results:  none     ASSESSMENT/PLAN:   (Z00.00) Routine general medical examination at a health care facility  (primary encounter diagnosis)  Comment: stable exam other than below. Fasting labs pending.   Plan: Lipid panel reflex to direct LDL Fasting            (M16.0) Bilateral hip joint arthritis  Comment:   Plan: Orthopedic  Referral            (I10) Essential hypertension  Comment: uncontrolled today but reported normal at home. Has not taken medication yet today. Educated to take this in the future prior to all appts unless otherwise informed not to. Will call and recheck bp over the phone in 3 days. Refills given. If uncontrolled, given possible nstemi in the past, consider adding BB such as metoprolol.   Plan: lisinopril (ZESTRIL) 40 MG tablet, Basic         metabolic panel  (Ca, Cl, CO2, Creat, Gluc, K,         Na, BUN), amLODIPine (NORVASC) 10 MG tablet,         chlorthalidone (HYGROTON) 25 MG tablet        -Medication use and side effects discussed with the patient. Patient is in complete understanding and agreement with plan.       (Z12.5) Screening for prostate cancer  Comment:   Plan: PSA, screen            (I21.4) NSTEMI (non-ST elevated  "myocardial infarction) (H)  Comment: questionable. Noted at past hospitalization. Needing stress testing. Recommend pursuing this. Ordered.   Plan: Echocardiogram Exercise Stress            (I51.7) LVH (left ventricular hypertrophy)  Comment: present on past echo. Will recheck during stress echo   Plan:     Patient has been advised of split billing requirements and indicates understanding: Yes  COUNSELING:   Reviewed preventive health counseling, as reflected in patient instructions       Regular exercise       Healthy diet/nutrition    Estimated body mass index is 26.5 kg/m  as calculated from the following:    Height as of 10/8/20: 1.88 m (6' 2\").    Weight as of this encounter: 93.6 kg (206 lb 6 oz).     Weight management plan: Discussed healthy diet and exercise guidelines    He reports that he has never smoked. He has never used smokeless tobacco.      Counseling Resources:  ATP IV Guidelines  Pooled Cohorts Equation Calculator  FRAX Risk Assessment  ICSI Preventive Guidelines  Dietary Guidelines for Americans, 2010  USDA's MyPlate  ASA Prophylaxis  Lung CA Screening    Tesfaye Contreras PA-C  [facility]"

## 2021-09-24 RX ORDER — ATORVASTATIN CALCIUM 80 MG/1
80 TABLET, FILM COATED ORAL DAILY
Qty: 90 TABLET | Refills: 3 | Status: SHIPPED | OUTPATIENT
Start: 2021-09-24 | End: 2021-11-29

## 2021-09-24 ASSESSMENT — ANXIETY QUESTIONNAIRES: GAD7 TOTAL SCORE: 0

## 2021-09-24 NOTE — TELEPHONE ENCOUNTER
Chart shows we are expecting a call from patient 3 days from visit (around 9/27) with BP reading(s). SB3 PAL RN will reach out today lab results and phone number to call in BP early next week.  Clem Hernandez RN - Patient Advocate and Liaison (PAL)  [facility]   [phone]

## 2021-09-24 NOTE — TELEPHONE ENCOUNTER
Patient informed of Bora's lab results comments and agrees to start Lipitor. [facility] pharmacy.   PAL RN introduced, patient agrees to call early next week with BP log.   Clem Hernandez RN - Patient Advocate and Liaison (PAL)  [facility]   [phone]

## 2021-09-27 NOTE — TELEPHONE ENCOUNTER
Patient picked up phone but did not respond to our question if this was an okay time to talk about blood pressure and new Rx.  No response.   We will try again later.   Clem Hernandez RN - Patient Advocate and Liaison (PAL)  [facility]   [phone]

## 2021-09-27 NOTE — TELEPHONE ENCOUNTER
Patient LM 2:35 PM. /98 20 minutes BEFORE he took amlodipine, chlorthalidone and lisinopril. We called Jamel. Educated to take BP at least 2 hours after taking med(s). He is in middle of visit with dentist now until 5 PM. He will recheck BP after the visit and call back.  New dentist.   BP Readings from Last 1 Encounters:   09/23/21 (!) 182/122   Clem Hernandez RN - Patient Advocate and Liaison (PAL)  [facility]   [phone]

## 2021-09-27 NOTE — TELEPHONE ENCOUNTER
Patient left  message 12:50 that he picked up cholesterol meds and is checking BP on his lunch break.   Call to patient at 1:22 PM. He is checking BP now and will call us back his afternoon.  Clem Hernandez RN - Patient Advocate and Liaison (PAL)  [facility]   [phone]

## 2021-09-27 NOTE — TELEPHONE ENCOUNTER
Mailbox full. Will try again later.   Clem Hernandez, RN - Patient Advocate and Liaison (PAL)  [facility]   [phone]

## 2021-09-29 ENCOUNTER — HOSPITAL ENCOUNTER (OUTPATIENT)
Dept: CARDIOLOGY | Facility: CLINIC | Age: 51
Discharge: HOME | End: 2021-09-29
Attending: PHYSICIAN ASSISTANT | Admitting: PHYSICIAN ASSISTANT
Payer: COMMERCIAL

## 2021-09-29 DIAGNOSIS — I21.4 NSTEMI (NON-ST ELEVATED MYOCARDIAL INFARCTION) (H): ICD-10-CM

## 2021-09-29 PROCEDURE — 93321 DOPPLER ECHO F-UP/LMTD STD: CPT | Mod: 26 | Performed by: INTERNAL MEDICINE

## 2021-09-29 PROCEDURE — 93350 STRESS TTE ONLY: CPT | Mod: 26 | Performed by: INTERNAL MEDICINE

## 2021-09-29 PROCEDURE — 93325 DOPPLER ECHO COLOR FLOW MAPG: CPT | Mod: 26 | Performed by: INTERNAL MEDICINE

## 2021-09-29 PROCEDURE — 93017 CV STRESS TEST TRACING ONLY: CPT

## 2021-09-29 PROCEDURE — 93016 CV STRESS TEST SUPVJ ONLY: CPT | Performed by: INTERNAL MEDICINE

## 2021-09-29 PROCEDURE — 93350 STRESS TTE ONLY: CPT | Mod: TC

## 2021-09-29 PROCEDURE — 93018 CV STRESS TEST I&R ONLY: CPT | Performed by: INTERNAL MEDICINE

## 2021-09-29 NOTE — LETTER
2021      Jamel Holloway  [address] [facility] [place] [address]        Dear ,    We are writing to inform you of your test results.    Your echo shows changes consistent with your hypertension. No other changes noted. Please remember to take your blood pressure medications daily as prescribed. This is important. Follow up with your PCP as scheduled.     Resulted Orders   Echocardiogram Exercise Stress    Narrative    [id number]  [facility]  [id number]  [id number]^[email or web address]^PHILLIP^PAUL     [facility]  Echocardiography Laboratory  [address] [place] [address]     Name: JAMEL HOLLOWAY  MRN: [id number]  : 1970  Study Date: 2021 11:31 AM  Age: 51 yrs  Gender: Male  Patient Location: [facility]  Reason For Study: NSTEMI (non-ST elevated myocardial infarction) (H)  History: High cholesterol,Hypertension  Ordering Physician: PHILLIP BRUSH  Referring Physician: PHILLIP BRUSH  Performed By: Echo Slade     BSA: 2.2 m2  Height: 74 in  Weight: 206 lb  HR: 98  BP: 134/80 mmHg  Medications: Lisinopril,Lipitor,hygroten,amlodopine  ______________________________________________________________________________  Procedure  Stress Echo Complete.  ______________________________________________________________________________  Interpretation Summary     1. Exercise echocardiogram is negative for myocardial ischemia at a peak heart  rate of 151 BPM (89% of maximal predicted heart rate).  2. Below-average exercise capacity (9.3 METS).  3. Resting hypertension with hypertensive response to exercise. Resting BP  190/90, peak exercise /90 mmHg.     ______________________________________________________________________________  Stress  The patient exercised 7:30.  RPP 33,300.  A high workload was achieved.  Exercise stopped due to hypertension.  There was a hypertensive BP response to exercise.  Exercise was stopped due to dyspnea.  The patient exhibited no chest  pain during exercise.  This study was stopped as the patient achieved an adequate exercise effort for  a diagnostic study.  A treadmill exercise test according to the Paulo protocol was performed.  The Duke treadmill score was low risk ( >5 Smith score).  Target Heart Rate was achieved.  This was a normal stress EKG with no evidence of stress-induced ischemia.  No arrhythmia noted.  This was a normal stress echocardiogram with no evidence of stress-induced  ischemia.  Normal resting wall motion and no stress-induced wall motion abnormality.  The visual ejection fraction is >70%.  Global LV systolic function augments with exercise.  Left ventricular cavity size decreases with exercise.     Baseline  Sinus rhythm with left ventricular hypertrophy.  Normal left ventricular function and wall motion at rest.  The visual ejection fraction is estimated at 60-65%.     Stress Results             Protocol:  Paulo          Maximum Predicted HR:   169 bpm             Target HR: 144 bpm        % Maximum Predicted HR: 89 %               Stage  DurationHeart Rate  BP             Comment                    (mm:ss)   (bpm)           Stage 1   3:00      117   190/90           Stage 2   3:00      137   210/90           Stage 3   1:30      150   222/90          RecoveryR  7:00      104   144/90RPP 41131, FAC Below, SMITH 7            Stress Duration:   7:30 mm:ss *        Recovery Time: 7:00 mm:ss         Maximum Stress HR: 150 bpm *           METS:          9     Left Ventricle  The left ventricle is normal in size. There is moderate concentric left  ventricular hypertrophy. Left ventricular systolic function is normal. The  visual ejection fraction is 60-65%. Normal left ventricular wall motion.     Right Ventricle  The right ventricle is normal in size and function.     Atria  Normal left atrial size. Right atrial size is normal.     Mitral Valve  The mitral valve leaflets appear normal. There is no evidence of stenosis,  fluttering,  or prolapse. There is trace mitral regurgitation.     Tricuspid Valve  Normal tricuspid valve. There is trace tricuspid regurgitation.     Aortic Valve  The aortic valve is trileaflet. There is mild (1+) aortic regurgitation. No  aortic stenosis is present.     Pulmonic Valve  Normal pulmonic valve.     Pericardium  There is no pericardial effusion.     Rhythm  Sinus rhythm was noted.  ______________________________________________________________________________  Report approved by: Dr Yaima Pan 09/29/2021 03:22 PM     ______________________________________________________________________________          If you have any questions or concerns, please call the clinic at the number listed above.       Sincerely,      Tesfaye Contreras PA-C

## 2021-09-30 NOTE — RESULT ENCOUNTER NOTE
Please send patient a letter to let them know results are normal.     Your echo shows changes consistent with your hypertension. No other changes noted. Please remember to take your blood pressure medications daily as prescribed. This is important. Follow up with your PCP as scheduled.     Dr. Interiano for Bora Contreras

## 2021-10-04 RX ORDER — METOPROLOL SUCCINATE 50 MG/1
50 TABLET, EXTENDED RELEASE ORAL DAILY
Qty: 90 TABLET | Refills: 0 | Status: SHIPPED | OUTPATIENT
Start: 2021-10-04 | End: 2021-11-29

## 2021-10-04 NOTE — TELEPHONE ENCOUNTER
bp is still too high. Would recommend adding metoprolol to regimen. Sent to pharmacy. Recheck bp in 2 weeks. Over phone or in office.     -gary rao, pac

## 2021-10-04 NOTE — TELEPHONE ENCOUNTER
Left message to call back.   Clem Hernandez, RN - Patient Advocate and Liaison (PAL)  [facility]   [phone]

## 2021-10-05 ENCOUNTER — PATIENT OUTREACH (OUTPATIENT)
Dept: FAMILY MEDICINE | Facility: CLINIC | Age: 51
End: 2021-10-05

## 2021-10-05 NOTE — TELEPHONE ENCOUNTER
Reminder to call patient 2 weeks (10/19/21) for home BP reading(s). Bora  added metoprolol. Patient's first dose 10/5/21.    Patient is expecting this call.   Clem Hernandez RN - Patient Advocate and Liaison (PAL)  [facility]   [phone]

## 2021-10-05 NOTE — TELEPHONE ENCOUNTER
Patient informed. He started metoprolol today. Prefers PAL RN phone call 10/19/21.   Clem Hernandez, RN - Patient Advocate and Liaison (PAL)  [facility]   [phone]

## 2021-10-19 NOTE — TELEPHONE ENCOUNTER
Improved but not quite fully controlled. Let's continue on current regimen and rechecking bp over the phone in 2 additional weeks.    -gary rao, pac

## 2021-10-19 NOTE — TELEPHONE ENCOUNTER
Jamel Sahin takes metoprolol at 6:30 AM daily. No reported missed doses.   BP log   10/11/21 135/91  10/13/21 129/82  10/18/21 2:30 /82  10/19/21  6:30 /91   Please advise.   Clem Hernandez, RN - Patient Advocate and Liaison (PAL)  [facility]   [phone]

## 2021-10-28 NOTE — TELEPHONE ENCOUNTER
Late entry. SB3 RN, call patient ~11/2/21 for BP log.   LUL Nj - PAL (Patient Advocate and Liaison)  [facility]  [phone]

## 2021-11-05 ENCOUNTER — PATIENT OUTREACH (OUTPATIENT)
Dept: FAMILY MEDICINE | Facility: CLINIC | Age: 51
End: 2021-11-05
Payer: COMMERCIAL

## 2021-11-05 NOTE — TELEPHONE ENCOUNTER
Call after 8 AM for 2 week BP log  Clem Hernandez RN - Patient Advocate and Liaison (PAL)  [facility]   [phone]

## 2021-11-08 NOTE — TELEPHONE ENCOUNTER
Patient does not have BP log at hand (at work, doing double shifts). We will call him tomorrow right around 9:30 AM.   Clem Hernandez RN - Patient Advocate and Liaison (PAL)  [facility]   [phone]

## 2021-11-09 NOTE — TELEPHONE ENCOUNTER
BP log (2021)    10/20 120/82   10/21 127/82  10/23 121/86  10/25 138/88   10/26 125/80   10/27 130/85  10/28 146/88   10/30 118/77 P68   11/2  134/85 P76  11/4 122/77 P78  11/9 137/88  And at 4 /77   Clem Hernandez RN - Patient Advocate and Liaison (PAL)  [facility]   [phone]

## 2021-11-09 NOTE — TELEPHONE ENCOUNTER
No answer. No voicemail.. Will try later.  Clem Hernandez, RN - Patient Advocate and Liaison (PAL)  [facility]   [phone]

## 2021-11-10 NOTE — TELEPHONE ENCOUNTER
Thanks priscilla,    Will route to station too. I believe there is a way to upload these into  to satisfy quality. Looks controlled. Let's stay on this current regimen. Lets do a virtual follow up in ~4 months.     -gary rao, pac

## 2021-11-26 NOTE — PROGRESS NOTES
Pre-Visit Planning   Next 5 appointments (look out 90 days)    Nov 29, 2021  2:00 PM  (Arrive by 1:40 PM)  Office Visit with Tesfaye Contreras PA-C  [facility] [address] [facility] [address] [phone]        Appointment Notes for this encounter:   f/u visit 4 mo  0956}  Patient preferred phone number: [phone]    11/26/21 Unable to reach. Left voicemail. Advised patient to call clinic back at [phone].

## 2021-11-29 ENCOUNTER — VIRTUAL VISIT (OUTPATIENT)
Dept: FAMILY MEDICINE | Facility: CLINIC | Age: 51
End: 2021-11-29
Payer: COMMERCIAL

## 2021-11-29 DIAGNOSIS — I21.4 NSTEMI (NON-ST ELEVATED MYOCARDIAL INFARCTION) (H): ICD-10-CM

## 2021-11-29 DIAGNOSIS — E78.2 MIXED HYPERLIPIDEMIA: ICD-10-CM

## 2021-11-29 DIAGNOSIS — I10 ESSENTIAL HYPERTENSION: Primary | ICD-10-CM

## 2021-11-29 PROCEDURE — 99213 OFFICE O/P EST LOW 20 MIN: CPT | Mod: 95 | Performed by: PHYSICIAN ASSISTANT

## 2021-11-29 RX ORDER — LISINOPRIL 40 MG/1
40 TABLET ORAL DAILY
Qty: 90 TABLET | Refills: 2 | Status: SHIPPED | OUTPATIENT
Start: 2021-11-29 | End: 2023-01-09

## 2021-11-29 RX ORDER — METOPROLOL SUCCINATE 50 MG/1
50 TABLET, EXTENDED RELEASE ORAL DAILY
Qty: 90 TABLET | Refills: 2 | Status: SHIPPED | OUTPATIENT
Start: 2021-11-29 | End: 2023-01-09

## 2021-11-29 RX ORDER — ATORVASTATIN CALCIUM 80 MG/1
80 TABLET, FILM COATED ORAL DAILY
Qty: 90 TABLET | Refills: 2 | Status: SHIPPED | OUTPATIENT
Start: 2021-11-29 | End: 2023-01-09

## 2021-11-29 RX ORDER — CHLORTHALIDONE 25 MG/1
25 TABLET ORAL DAILY
Qty: 90 TABLET | Refills: 2 | Status: SHIPPED | OUTPATIENT
Start: 2021-11-29 | End: 2023-01-09

## 2021-11-29 NOTE — PROGRESS NOTES
Jamel is a 51 year old who is being evaluated via a billable video visit.      How would you like to obtain your AVS? Mail a copy  Send to e-mail at: [email or web address]  Will anyone else be joining your video visit? No  \  Video Start Time: 2:16 PM    Assessment & Plan         Essential hypertension  Much improved. No longer needing norvasc. Will stop. Continue on remaining regimen. Recheck labs in march and annual wellness to be continued.   - chlorthalidone (HYGROTON) 25 MG tablet; Take 1 tablet (25 mg) by mouth daily  - metoprolol succinate ER (TOPROL-XL) 50 MG 24 hr tablet; Take 1 tablet (50 mg) by mouth daily  - lisinopril (ZESTRIL) 40 MG tablet; Take 1 tablet (40 mg) by mouth daily  -Medication use and side effects discussed with the patient. Patient is in complete understanding and agreement with plan.       NSTEMI (non-ST elevated myocardial infarction) (H)  Past history questionable. Recent stress test showed no ischemia   - atorvastatin (LIPITOR) 80 MG tablet; Take 1 tablet (80 mg) by mouth daily  -Medication use and side effects discussed with the patient. Patient is in complete understanding and agreement with plan.       Mixed hyperlipidemia    - atorvastatin (LIPITOR) 80 MG tablet; Take 1 tablet (80 mg) by mouth daily      Return in about 3 months (around 2/28/2022) for Lab Work lab only. .    Tesfaye Contreras PA-C  [facility]    Ezio Mccullough is a 51 year old who presents for the following health issues HPI     Hypertension Follow-up      Do you check your blood pressure regularly outside of the clinic? Yes     Are you following a low salt diet? Yes    Are your blood pressures ever more than 140 on the top number (systolic) OR more   than 90 on the bottom number (diastolic), for example 140/90? No      How many servings of fruits and vegetables do you eat daily?  4 or more    On average, how many sweetened beverages do you drink each day (Examples: soda,  "juice, sweet tea, etc.  Do NOT count diet or artificially sweetened beverages)?   0    How many days per week do you exercise enough to make your heart beat faster? 3 or less    How many minutes a day do you exercise enough to make your heart beat faster? 30 - 60  How many days per week do you miss taking your medication? 1    What makes it hard for you to take your medications?  remembering to take    Medication Followup of : Multiple    Taking Medication as prescribed: Pt reported he stopped taking Amlodipine 1 month ago    Side Effects:  None    Medication Helping Symptoms:  Without Amlodipine, Pt feels better       Review of Systems   Constitutional, HEENT, cardiovascular, pulmonary, GI, , musculoskeletal, neuro, skin, endocrine and psych systems are negative, except as otherwise noted.      Objective    Vitals - Patient Reported  Systolic (Patient Reported): 134  Diastolic (Patient Reported): 85  Weight (Patient Reported): 90.3 kg (199 lb)  Height (Patient Reported): 186.7 cm (6' 1.5\")  BMI (Based on Pt Reported Ht/Wt): 25.9  Pulse (Patient Reported): 76      Vitals:  No vitals were obtained today due to virtual visit.    Physical Exam   GENERAL: Healthy, alert and no distress  EYES: Eyes grossly normal to inspection.  No discharge or erythema, or obvious scleral/conjunctival abnormalities.  RESP: No audible wheeze, cough, or visible cyanosis.  No visible retractions or increased work of breathing.    SKIN: Visible skin clear. No significant rash, abnormal pigmentation or lesions.  NEURO: Cranial nerves grossly intact.  Mentation and speech appropriate for age.  PSYCH: Mentation appears normal, affect normal/bright, judgement and insight intact, normal speech and appearance well-groomed.              Video-Visit Details    Type of service:  Video Visit    Video End Time:2:18 PM    Originating Location (pt. Location): Home    Distant Location (provider location):  [facility]"     Platform used for Video Visit: Stefan

## 2021-12-02 ENCOUNTER — HOSPITAL ENCOUNTER (OUTPATIENT)
Facility: CLINIC | Age: 51
End: 2021-12-02
Attending: ORTHOPAEDIC SURGERY | Admitting: ORTHOPAEDIC SURGERY
Payer: COMMERCIAL

## 2021-12-29 DIAGNOSIS — Z11.59 ENCOUNTER FOR SCREENING FOR OTHER VIRAL DISEASES: ICD-10-CM

## 2022-03-01 ENCOUNTER — OFFICE VISIT (OUTPATIENT)
Dept: FAMILY MEDICINE | Facility: CLINIC | Age: 52
End: 2022-03-01
Payer: COMMERCIAL

## 2022-03-01 VITALS
HEIGHT: 73 IN | WEIGHT: 200 LBS | TEMPERATURE: 97.4 F | OXYGEN SATURATION: 99 % | BODY MASS INDEX: 26.51 KG/M2 | HEART RATE: 66 BPM | SYSTOLIC BLOOD PRESSURE: 138 MMHG | DIASTOLIC BLOOD PRESSURE: 82 MMHG

## 2022-03-01 DIAGNOSIS — I51.7 LVH (LEFT VENTRICULAR HYPERTROPHY): ICD-10-CM

## 2022-03-01 DIAGNOSIS — Z01.818 PREOP GENERAL PHYSICAL EXAM: Primary | ICD-10-CM

## 2022-03-01 DIAGNOSIS — M16.11 ARTHRITIS OF RIGHT HIP: ICD-10-CM

## 2022-03-01 DIAGNOSIS — I10 ESSENTIAL HYPERTENSION: ICD-10-CM

## 2022-03-01 PROBLEM — I21.4 NSTEMI (NON-ST ELEVATED MYOCARDIAL INFARCTION) (H): Status: RESOLVED | Noted: 2020-10-08 | Resolved: 2022-03-01

## 2022-03-01 LAB
ERYTHROCYTE [DISTWIDTH] IN BLOOD BY AUTOMATED COUNT: 12.2 % (ref 10–15)
HCT VFR BLD AUTO: 41.4 % (ref 40–53)
HGB BLD-MCNC: 13.7 G/DL (ref 13.3–17.7)
MCH RBC QN AUTO: 29.2 PG (ref 26.5–33)
MCHC RBC AUTO-ENTMCNC: 33.1 G/DL (ref 31.5–36.5)
MCV RBC AUTO: 88 FL (ref 78–100)
PLATELET # BLD AUTO: 259 10E3/UL (ref 150–450)
RBC # BLD AUTO: 4.69 10E6/UL (ref 4.4–5.9)
WBC # BLD AUTO: 5 10E3/UL (ref 4–11)

## 2022-03-01 PROCEDURE — 85027 COMPLETE CBC AUTOMATED: CPT | Performed by: PHYSICIAN ASSISTANT

## 2022-03-01 PROCEDURE — 99214 OFFICE O/P EST MOD 30 MIN: CPT | Performed by: PHYSICIAN ASSISTANT

## 2022-03-01 PROCEDURE — 80048 BASIC METABOLIC PNL TOTAL CA: CPT | Performed by: PHYSICIAN ASSISTANT

## 2022-03-01 PROCEDURE — 93000 ELECTROCARDIOGRAM COMPLETE: CPT | Performed by: PHYSICIAN ASSISTANT

## 2022-03-01 PROCEDURE — 36415 COLL VENOUS BLD VENIPUNCTURE: CPT | Performed by: PHYSICIAN ASSISTANT

## 2022-03-01 SDOH — ECONOMIC STABILITY: TRANSPORTATION INSECURITY
IN THE PAST 12 MONTHS, HAS LACK OF TRANSPORTATION KEPT YOU FROM MEETINGS, WORK, OR FROM GETTING THINGS NEEDED FOR DAILY LIVING?: NO

## 2022-03-01 SDOH — HEALTH STABILITY: PHYSICAL HEALTH: ON AVERAGE, HOW MANY MINUTES DO YOU ENGAGE IN EXERCISE AT THIS LEVEL?: 20 MIN

## 2022-03-01 SDOH — ECONOMIC STABILITY: INCOME INSECURITY: HOW HARD IS IT FOR YOU TO PAY FOR THE VERY BASICS LIKE FOOD, HOUSING, MEDICAL CARE, AND HEATING?: NOT HARD AT ALL

## 2022-03-01 SDOH — ECONOMIC STABILITY: TRANSPORTATION INSECURITY
IN THE PAST 12 MONTHS, HAS THE LACK OF TRANSPORTATION KEPT YOU FROM MEDICAL APPOINTMENTS OR FROM GETTING MEDICATIONS?: NO

## 2022-03-01 SDOH — ECONOMIC STABILITY: FOOD INSECURITY: WITHIN THE PAST 12 MONTHS, THE FOOD YOU BOUGHT JUST DIDN'T LAST AND YOU DIDN'T HAVE MONEY TO GET MORE.: NEVER TRUE

## 2022-03-01 SDOH — ECONOMIC STABILITY: INCOME INSECURITY: IN THE LAST 12 MONTHS, WAS THERE A TIME WHEN YOU WERE NOT ABLE TO PAY THE MORTGAGE OR RENT ON TIME?: NO

## 2022-03-01 SDOH — HEALTH STABILITY: PHYSICAL HEALTH: ON AVERAGE, HOW MANY DAYS PER WEEK DO YOU ENGAGE IN MODERATE TO STRENUOUS EXERCISE (LIKE A BRISK WALK)?: 2 DAYS

## 2022-03-01 SDOH — ECONOMIC STABILITY: FOOD INSECURITY: WITHIN THE PAST 12 MONTHS, YOU WORRIED THAT YOUR FOOD WOULD RUN OUT BEFORE YOU GOT MONEY TO BUY MORE.: NEVER TRUE

## 2022-03-01 ASSESSMENT — ANXIETY QUESTIONNAIRES
GAD7 TOTAL SCORE: 0
7. FEELING AFRAID AS IF SOMETHING AWFUL MIGHT HAPPEN: NOT AT ALL
7. FEELING AFRAID AS IF SOMETHING AWFUL MIGHT HAPPEN: NOT AT ALL
5. BEING SO RESTLESS THAT IT IS HARD TO SIT STILL: NOT AT ALL
GAD7 TOTAL SCORE: 0
2. NOT BEING ABLE TO STOP OR CONTROL WORRYING: NOT AT ALL
1. FEELING NERVOUS, ANXIOUS, OR ON EDGE: NOT AT ALL
3. WORRYING TOO MUCH ABOUT DIFFERENT THINGS: NOT AT ALL
GAD7 TOTAL SCORE: 0
6. BECOMING EASILY ANNOYED OR IRRITABLE: NOT AT ALL
4. TROUBLE RELAXING: NOT AT ALL

## 2022-03-01 ASSESSMENT — LIFESTYLE VARIABLES
HOW OFTEN DO YOU HAVE A DRINK CONTAINING ALCOHOL: MONTHLY OR LESS
HOW OFTEN DO YOU HAVE SIX OR MORE DRINKS ON ONE OCCASION: NEVER
HOW MANY STANDARD DRINKS CONTAINING ALCOHOL DO YOU HAVE ON A TYPICAL DAY: 1 OR 2

## 2022-03-01 ASSESSMENT — SOCIAL DETERMINANTS OF HEALTH (SDOH)
HOW OFTEN DO YOU ATTEND CHURCH OR RELIGIOUS SERVICES?: MORE THAN 4 TIMES PER YEAR
IN A TYPICAL WEEK, HOW MANY TIMES DO YOU TALK ON THE PHONE WITH FAMILY, FRIENDS, OR NEIGHBORS?: TWICE A WEEK
HOW OFTEN DO YOU GET TOGETHER WITH FRIENDS OR RELATIVES?: ONCE A WEEK
DO YOU BELONG TO ANY CLUBS OR ORGANIZATIONS SUCH AS CHURCH GROUPS UNIONS, FRATERNAL OR ATHLETIC GROUPS, OR SCHOOL GROUPS?: NO

## 2022-03-01 ASSESSMENT — PATIENT HEALTH QUESTIONNAIRE - PHQ9
SUM OF ALL RESPONSES TO PHQ QUESTIONS 1-9: 0
SUM OF ALL RESPONSES TO PHQ QUESTIONS 1-9: 0
10. IF YOU CHECKED OFF ANY PROBLEMS, HOW DIFFICULT HAVE THESE PROBLEMS MADE IT FOR YOU TO DO YOUR WORK, TAKE CARE OF THINGS AT HOME, OR GET ALONG WITH OTHER PEOPLE: NOT DIFFICULT AT ALL

## 2022-03-01 NOTE — PATIENT INSTRUCTIONS
Hold your lisinopril the day of your surgery. May start day after.     No asa until after surgery.     No ibuprofen day prior to surgery.     No aleve 4 days prior to surgery.     Preparing for Your Surgery  Getting started  A nurse will call you to review your health history and instructions. They will give you an arrival time based on your scheduled surgery time. Please be ready to share:    Your doctor's clinic name and phone number    Your medical, surgical and anesthesia history    A list of allergies and sensitivities    A list of medicines, including herbal treatments and over-the-counter drugs    Whether the patient has a legal guardian (ask how to send us the papers in advance)  Please tell us if you're pregnant--or if there's any chance you might be pregnant. Some surgeries may injure a fetus (unborn baby), so they require a pregnancy test. Surgeries that are safe for a fetus don't always need a test, and you can choose whether to have one.   If you have a child who's having surgery, please ask for a copy of Preparing for Your Child's Surgery.    Preparing for surgery    Within 30 days of surgery: Have a pre-op exam (sometimes called an H&P, or History and Physical). This can be done at a clinic or pre-operative center.  ? If you're having a , you may not need this exam. Talk to your care team.    At your pre-op exam, talk to your care team about all medicines you take. If you need to stop any medicines before surgery, ask when to start taking them again.  ? We do this for your safety. Many medicines can make you bleed too much during surgery. Some change how well surgery (anesthesia) drugs work.    Call your insurance company to let them know you're having surgery. (If you don't have insurance, call [phone].)    Call your clinic if there's any change in your health. This includes signs of a cold or flu (sore throat, runny nose, cough, rash, fever). It also includes a scrape or  scratch near the surgery site.    If you have questions on the day of surgery, call your hospital or surgery center.  COVID testing  You may need to be tested for COVID-19 before having surgery. If so, your surgical team will give you instructions for scheduling this test, separate from your preoperative history and physical.  Eating and drinking guidelines  For your safety: Unless your surgeon tells you otherwise, follow the guidelines below.    Eat and drink as usual until 8 hours before surgery. After that, no food or milk.    Drink clear liquids until 2 hours before surgery. These are liquids you can see through, like water, Gatorade and Propel Water. You may also have black coffee and tea (no cream or milk).    Nothing by mouth within 2 hours of surgery. This includes gum, candy and breath mints.    If you drink alcohol: Stop drinking it the night before surgery.    If your care team tells you to take medicine on the morning of surgery, it's okay to take it with a sip of water.  Preventing infection    Shower or bathe the night before and morning of your surgery. Follow the instructions your clinic gave you. (If no instructions, use regular soap.)    Don't shave or clip hair near your surgery site. We'll remove the hair if needed.    Don't smoke or vape the morning of surgery. You may chew nicotine gum up to 2 hours before surgery. A nicotine patch is okay.  ? Note: Some surgeries require you to completely quit smoking and nicotine. Check with your surgeon.    Your care team will make every effort to keep you safe from infection. We will:  ? Clean our hands often with soap and water (or an alcohol-based hand rub).  ? Clean the skin at your surgery site with a special soap that kills germs.  ? Give you a special gown to keep you warm. (Cold raises the risk of infection.)  ? Wear special hair covers, masks, gowns and gloves during surgery.  ? Give antibiotic medicine, if prescribed. Not all surgeries need  antibiotics.  What to bring on the day of surgery    Photo ID and insurance card    Copy of your health care directive, if you have one    Glasses and hearing aides (bring cases)  ? You can't wear contacts during surgery    Inhaler and eye drops, if you use them (tell us about these when you arrive)    CPAP machine or breathing device, if you use them    A few personal items, if spending the night    If you have . . .  ? A pacemaker, ICD (cardiac defibrillator) or other implant: Bring the ID card.  ? An implanted stimulator: Bring the remote control.  ? A legal guardian: Bring a copy of the certified (court-stamped) guardianship papers.  Please remove any jewelry, including body piercings. Leave jewelry and other valuables at home.  If you're going home the day of surgery    You must have a responsible adult drive you home. They should stay with you overnight as well.    If you don't have someone to stay with you, and you aren't safe to go home alone, we may keep you overnight. Insurance often won't pay for this.  After surgery  If it's hard to control your pain or you need more pain medicine, please call your surgeon's office.  Questions?   If you have any questions for your care team, list them here: _________________________________________________________________________________________________________________________________________________________________________ ____________________________________ ____________________________________ ____________________________________  For informational purposes only. Not to replace the advice of your health care provider. Copyright   2003, 2019 [facility]. All rights reserved. Clinically reviewed by Angi Malagon MD. [employer] [id number] - REV 07/21.

## 2022-03-01 NOTE — PROGRESS NOTES
[facility]  [id number] [place] [id number]  Phone: [phone]  Primary Provider: Phillip Brush  Pre-op Performing Provider: PHILLIP BRUSH      PREOPERATIVE EVALUATION:  Today's date: 3/1/2022    Jamel Jasso is a 51 year old male who presents for a preoperative evaluation.    Surgical Information:  Surgery/Procedure: Direct Anterior, Total Hip Arthroplasty - Right  Surgery Location: [address] [facility] [address]  Surgeon: Dr. Nascimento  Surgery Date: 03/10/22  Time of Surgery: 7:00 AM  Where patient plans to recover: At home with family  Fax number for surgical facility: [phone]    Type of Anesthesia Anticipated: General    Assessment & Plan     The proposed surgical procedure is considered INTERMEDIATE risk.    Preop general physical exam  Stable exam. Cleared pending remaining normal lab work up.   - EKG 12-lead complete w/read - Clinics  - CBC with platelets; Future  - Basic metabolic panel  (Ca, Cl, CO2, Creat, Gluc, K, Na, BUN); Future    Arthritis of right hip      LVH (left ventricular hypertrophy)  History of this. Currently managed on blood pressure medications. Did have questionable previous nstemi however, recent stress testing in sept of 2021 showed no ischemia.   - EKG 12-lead complete w/read - Clinics    Essential hypertension  Stable.            Risks and Recommendations:  The patient has the following additional risks and recommendations for perioperative complications:  Cardiovascular:   - stress test obtained in sept of 2021. No ischemia seen.     Medication Instructions:  Patient is to take all scheduled medications on the day of surgery EXCEPT for modifications listed below:   - ACE/ARB: HOLD due to exceptional risk of hypotension during surgery.    - Beta Blockers: Continue taking on the day of surgery.    RECOMMENDATION:  APPROVAL GIVEN to proceed with proposed procedure, without further diagnostic  evaluation.    }    Subjective     HPI related to upcoming procedure: history of right hip arthritis the above procedure was deemed the next best step in management     Preop Questions 3/1/2022   1. Have you ever had a heart attack or stroke? No   2. Have you ever had surgery on your heart or blood vessels, such as a stent placement, a coronary artery bypass, or surgery on an artery in your head, neck, heart, or legs? No   3. Do you have chest pain with activity? No   4. Do you have a history of  heart failure? No   5. Do you currently have a cold, bronchitis or symptoms of other infection? No   6. Do you have a cough, shortness of breath, or wheezing? No   7. Do you or anyone in your family have previous history of blood clots? No   8. Do you or does anyone in your family have a serious bleeding problem such as prolonged bleeding following surgeries or cuts? No   9. Have you ever had problems with anemia or been told to take iron pills? YES -    10. Have you had any abnormal blood loss such as black, tarry or bloody stools? No   11. Have you ever had a blood transfusion? No   12. Are you willing to have a blood transfusion if it is medically needed before, during, or after your surgery? Yes   13. Have you or any of your relatives ever had problems with anesthesia? No   14. Do you have sleep apnea, excessive snoring or daytime drowsiness? No   15. Do you have any artifical heart valves or other implanted medical devices like a pacemaker, defibrillator, or continuous glucose monitor? No   16. Do you have artificial joints? No   17. Are you allergic to latex? YES:        Health Care Directive:  Patient does not have a Health Care Directive or Living Will: Discussed advance care planning with patient; information given to patient to review.    Preoperative Review of :   reviewed - no record of controlled substances prescribed.      Status of Chronic Conditions:  See problem list for active medical problems.   Problems all longstanding and stable, except as noted/documented.  See ROS for pertinent symptoms related to these conditions.    CAD - past history of nstemi. However, no evidence of ischemia seen on past stress testing . Last Stress test 9/29/21, EKG 3/1/22.     HYPERLIPIDEMIA - Patient has a long history of significant Hyperlipidemia requiring medication for treatment with recent fair control. Patient reports no problems or side effects with the medication.     HYPERTENSION - Patient has longstanding history of HTN , currently denies any symptoms referable to elevated blood pressure. Specifically denies chest pain, palpitations, dyspnea, orthopnea, PND or peripheral edema. Blood pressure readings have been in normal range. Current medication regimen is as listed below. Patient denies any side effects of medication.       Review of Systems  CONSTITUTIONAL: NEGATIVE for fever, chills, change in weight  INTEGUMENTARY/SKIN: NEGATIVE for worrisome rashes, moles or lesions  EYES: NEGATIVE for vision changes or irritation  ENT/MOUTH: NEGATIVE for ear, mouth and throat problems  RESP: NEGATIVE for significant cough or SOB  CV: NEGATIVE for chest pain, palpitations or peripheral edema  GI: NEGATIVE for nausea, abdominal pain, heartburn, or change in bowel habits  : NEGATIVE for frequency, dysuria, or hematuria  MUSCULOSKELETAL: NEGATIVE for significant arthralgias or myalgia  NEURO: NEGATIVE for weakness, dizziness or paresthesias  ENDOCRINE: NEGATIVE for temperature intolerance, skin/hair changes  HEME: NEGATIVE for bleeding problems  PSYCHIATRIC: NEGATIVE for changes in mood or affect    Patient Active Problem List    Diagnosis Date Noted     LVH (left ventricular hypertrophy) 09/23/2021     Priority: Medium     Essential hypertension 09/23/2021     Priority: Medium     Adenomatous polyp of colon 10/20/2020     Priority: Medium     Hypertensive emergency 10/08/2020     Priority: Medium     Hypertensive urgency  "10/08/2020     Priority: Medium      History reviewed. No pertinent past medical history.  History reviewed. No pertinent surgical history.  Current Outpatient Medications   Medication Sig Dispense Refill     atorvastatin (LIPITOR) 80 MG tablet Take 1 tablet (80 mg) by mouth daily 90 tablet 2     chlorthalidone (HYGROTON) 25 MG tablet Take 1 tablet (25 mg) by mouth daily 90 tablet 2     ferrous fumarate 65 mg, [place]. FE,-Vitamin C 125 mg (VITRON C)  MG TABS tablet Take 1 tablet by mouth daily       lisinopril (ZESTRIL) 40 MG tablet Take 1 tablet (40 mg) by mouth daily 90 tablet 2     metoprolol succinate ER (TOPROL-XL) 50 MG 24 hr tablet Take 1 tablet (50 mg) by mouth daily 90 tablet 2     multivitamin w/minerals (CERTAVITE/ANTIOXIDANTS) tablet TAKE ONE TABLET BY MOUTH ONCE DAILY         Allergies   Allergen Reactions     Shellfish Allergy Anaphylaxis     Bicillin C-R,      Other reaction(s): *Unknown     Penicillins         Social History     Tobacco Use     Smoking status: Never Smoker     Smokeless tobacco: Never Used   Substance Use Topics     Alcohol use: Yes     Comment: 1x week     Family History   Family history unknown: Yes     History   Drug Use Unknown         Objective     /82 (BP Location: Left arm, Patient Position: Chair, Cuff Size: Adult Large)   Pulse 66   Temp 97.4  F (36.3  C) (Oral)   Ht 1.854 m (6' 1\")   Wt 90.7 kg (200 lb)   SpO2 99%   BMI 26.39 kg/m      Physical Exam    GENERAL APPEARANCE: healthy, alert and no distress     EYES: EOMI,  PERRL     HENT: ear canals and TM's normal and nose and mouth without ulcers or lesions     NECK: no adenopathy, no asymmetry, masses, or scars and thyroid normal to palpation     RESP: lungs clear to auscultation - no rales, rhonchi or wheezes     CV: regular rates and rhythm, normal S1 S2, no S3 or S4 and no murmur, click or rub     ABDOMEN:  soft, nontender, no HSM or masses and bowel sounds normal     MS: extremities normal- no gross " deformities noted, no evidence of inflammation in joints, FROM in all extremities.     SKIN: no suspicious lesions or rashes     NEURO: Normal strength and tone, sensory exam grossly normal, mentation intact and speech normal     PSYCH: mentation appears normal. and affect normal/bright     LYMPHATICS: No cervical adenopathy    Recent Labs   Lab Test 09/23/21  0935 10/08/20  1655   HGB  --  13.7   PLT  --  236    135   POTASSIUM 3.7 3.4   CR 1.05 1.19        Diagnostics:  Labs pending at this time.  Results will be reviewed when available.  Recent Results (from the past 24 hour(s))   CBC with platelets    Collection Time: 03/01/22  2:11 PM   Result Value Ref Range    WBC Count 5.0 4.0 - 11.0 10e3/uL    RBC Count 4.69 4.40 - 5.90 10e6/uL    Hemoglobin 13.7 13.3 - 17.7 g/dL    Hematocrit 41.4 40.0 - 53.0 %    MCV 88 78 - 100 fL    MCH 29.2 26.5 - 33.0 pg    MCHC 33.1 31.5 - 36.5 g/dL    RDW 12.2 10.0 - 15.0 %    Platelet Count 259 150 - 450 10e3/uL      EKG: nsr, LAD, normal intervals, no acute ST/T changes c/w ischemia,    Revised Cardiac Risk Index (RCRI):  The patient has the following serious cardiovascular risks for perioperative complications:   - Coronary Artery Disease (MI, positive stress test, angina, Qs on EKG) = 1 point     RCRI Interpretation: 1 point: Class II (low risk - 0.9% complication rate)           Signed Electronically by: Tesfaye Contreras PA-C  Copy of this evaluation report is provided to requesting physician.

## 2022-03-02 LAB
ANION GAP SERPL CALCULATED.3IONS-SCNC: 8 MMOL/L (ref 3–14)
BUN SERPL-MCNC: 18 MG/DL (ref 7–30)
CALCIUM SERPL-MCNC: 9.9 MG/DL (ref 8.5–10.1)
CHLORIDE BLD-SCNC: 107 MMOL/L (ref 94–109)
CO2 SERPL-SCNC: 21 MMOL/L (ref 20–32)
CREAT SERPL-MCNC: 1.25 MG/DL (ref 0.66–1.25)
GFR SERPL CREATININE-BSD FRML MDRD: 70 ML/MIN/1.73M2
GLUCOSE BLD-MCNC: 91 MG/DL (ref 70–99)
POTASSIUM BLD-SCNC: 4 MMOL/L (ref 3.4–5.3)
SODIUM SERPL-SCNC: 136 MMOL/L (ref 133–144)

## 2022-03-02 ASSESSMENT — PATIENT HEALTH QUESTIONNAIRE - PHQ9: SUM OF ALL RESPONSES TO PHQ QUESTIONS 1-9: 0

## 2022-03-02 ASSESSMENT — ANXIETY QUESTIONNAIRES: GAD7 TOTAL SCORE: 0

## 2022-03-10 ENCOUNTER — TRANSFERRED RECORDS (OUTPATIENT)
Dept: HEALTH INFORMATION MANAGEMENT | Facility: CLINIC | Age: 52
End: 2022-03-10
Payer: COMMERCIAL

## 2022-03-15 ENCOUNTER — TRANSFERRED RECORDS (OUTPATIENT)
Dept: HEALTH INFORMATION MANAGEMENT | Facility: CLINIC | Age: 52
End: 2022-03-15
Payer: COMMERCIAL

## 2022-03-29 ENCOUNTER — TRANSFERRED RECORDS (OUTPATIENT)
Dept: HEALTH INFORMATION MANAGEMENT | Facility: CLINIC | Age: 52
End: 2022-03-29
Payer: COMMERCIAL

## 2022-03-29 ENCOUNTER — PATIENT OUTREACH (OUTPATIENT)
Dept: FAMILY MEDICINE | Facility: CLINIC | Age: 52
End: 2022-03-29
Payer: COMMERCIAL

## 2022-03-29 NOTE — TELEPHONE ENCOUNTER
Call to Jamel. He is scheduled for next post op with [facility] 5 weeks from today (5/3/22).   Tramadol, Tylenol and ibuprofen prescribed.  Oxy not refilled. Pain will be reassessed at next visit.   No action needed.   Clem Hernandez RN - Patient Advocate and Liaison (PAL)  [facility]   [phone]

## 2022-03-29 NOTE — TELEPHONE ENCOUNTER
Patient left message 9:11 AM requesting refill oxycodone. Hip surgery 2 weeks ago [facility]. Surgeon refilled tramadol and hydroxyzine but oxy was not approved.   At time of call Jamel was on his way to a post op appointment.    Left message for Jamel that surgeon typically manages post op pain meds -  usually up to 6 weeks. Please call back with pain med update after your post op appointment this morning.    From PCP 3/1/22 preop note:   Surgery/Procedure: Direct Anterior, Total Hip Arthroplasty - Right  Surgery Location: [address]  Surgeon: Dr. Nascimento  Surgery Date: 03/10/22  Clem Hernandez RN - Patient Advocate and Liaison (PAL)  [facility]   [phone]

## 2022-05-06 ENCOUNTER — TRANSFERRED RECORDS (OUTPATIENT)
Dept: HEALTH INFORMATION MANAGEMENT | Facility: CLINIC | Age: 52
End: 2022-05-06

## 2022-05-09 ENCOUNTER — OFFICE VISIT (OUTPATIENT)
Dept: FAMILY MEDICINE | Facility: CLINIC | Age: 52
End: 2022-05-09
Payer: COMMERCIAL

## 2022-05-09 VITALS
OXYGEN SATURATION: 98 % | HEART RATE: 90 BPM | WEIGHT: 207 LBS | SYSTOLIC BLOOD PRESSURE: 170 MMHG | BODY MASS INDEX: 27.31 KG/M2 | DIASTOLIC BLOOD PRESSURE: 80 MMHG | TEMPERATURE: 98.9 F | RESPIRATION RATE: 12 BRPM

## 2022-05-09 DIAGNOSIS — F52.0 LACK OF SEXUAL DESIRE: Primary | ICD-10-CM

## 2022-05-09 PROCEDURE — 99214 OFFICE O/P EST MOD 30 MIN: CPT | Performed by: FAMILY MEDICINE

## 2022-05-17 ENCOUNTER — ALLIED HEALTH/NURSE VISIT (OUTPATIENT)
Dept: FAMILY MEDICINE | Facility: CLINIC | Age: 52
End: 2022-05-17
Payer: COMMERCIAL

## 2022-05-17 VITALS — DIASTOLIC BLOOD PRESSURE: 80 MMHG | SYSTOLIC BLOOD PRESSURE: 138 MMHG

## 2022-05-17 DIAGNOSIS — Z01.30 BLOOD PRESSURE CHECK: Primary | ICD-10-CM

## 2022-05-17 PROCEDURE — 99207 PR NO CHARGE NURSE ONLY: CPT

## 2022-05-31 ENCOUNTER — PATIENT OUTREACH (OUTPATIENT)
Dept: FAMILY MEDICINE | Facility: CLINIC | Age: 52
End: 2022-05-31
Payer: COMMERCIAL

## 2022-05-31 NOTE — TELEPHONE ENCOUNTER
Call to patient f/up 5/9/22 OV lab orders per Dr. Paniagua. Offered lab only. Scheduled.   Patient will fast, drink water during fast, for lipids PCP ordered in December 2021.   Clem Hernandez RN - Patient Advocate and Liaison (PAL)  [facility]   [phone]

## 2022-06-02 ENCOUNTER — TRANSFERRED RECORDS (OUTPATIENT)
Dept: HEALTH INFORMATION MANAGEMENT | Facility: CLINIC | Age: 52
End: 2022-06-02
Payer: COMMERCIAL

## 2022-06-08 ENCOUNTER — LAB (OUTPATIENT)
Dept: LAB | Facility: CLINIC | Age: 52
End: 2022-06-08
Payer: COMMERCIAL

## 2022-06-08 DIAGNOSIS — E78.2 MIXED HYPERLIPIDEMIA: ICD-10-CM

## 2022-06-08 DIAGNOSIS — I21.4 NSTEMI (NON-ST ELEVATED MYOCARDIAL INFARCTION) (H): ICD-10-CM

## 2022-06-08 DIAGNOSIS — F52.0 LACK OF SEXUAL DESIRE: ICD-10-CM

## 2022-06-08 LAB
ALBUMIN SERPL-MCNC: 4 G/DL (ref 3.4–5)
ALP SERPL-CCNC: 108 U/L (ref 40–150)
ALT SERPL W P-5'-P-CCNC: 36 U/L (ref 0–70)
AST SERPL W P-5'-P-CCNC: 35 U/L (ref 0–45)
BILIRUB DIRECT SERPL-MCNC: 0.1 MG/DL (ref 0–0.2)
BILIRUB SERPL-MCNC: 0.4 MG/DL (ref 0.2–1.3)
CHOLEST SERPL-MCNC: 171 MG/DL
FASTING STATUS PATIENT QL REPORTED: YES
HDLC SERPL-MCNC: 47 MG/DL
LDLC SERPL CALC-MCNC: 109 MG/DL
NONHDLC SERPL-MCNC: 124 MG/DL
PROT SERPL-MCNC: 8.1 G/DL (ref 6.8–8.8)
TRIGL SERPL-MCNC: 73 MG/DL
TSH SERPL DL<=0.005 MIU/L-ACNC: 1.01 MU/L (ref 0.4–4)

## 2022-06-08 PROCEDURE — 84403 ASSAY OF TOTAL TESTOSTERONE: CPT

## 2022-06-08 PROCEDURE — 80076 HEPATIC FUNCTION PANEL: CPT

## 2022-06-08 PROCEDURE — 36415 COLL VENOUS BLD VENIPUNCTURE: CPT

## 2022-06-08 PROCEDURE — 80061 LIPID PANEL: CPT

## 2022-06-08 PROCEDURE — 84443 ASSAY THYROID STIM HORMONE: CPT

## 2022-06-10 LAB — TESTOST SERPL-MCNC: 492 NG/DL (ref 240–950)

## 2022-10-11 ENCOUNTER — TRANSFERRED RECORDS (OUTPATIENT)
Dept: HEALTH INFORMATION MANAGEMENT | Facility: CLINIC | Age: 52
End: 2022-10-11

## 2022-11-29 ENCOUNTER — PATIENT OUTREACH (OUTPATIENT)
Dept: FAMILY MEDICINE | Facility: CLINIC | Age: 52
End: 2022-11-29

## 2022-11-29 NOTE — TELEPHONE ENCOUNTER
Chart review. Due for visit. Reminder letter mailed.   Clem Hernandez RN - Patient Advocate and Liaison (PAL)  [facility]   [phone]

## 2022-11-29 NOTE — LETTER
November 29, 2022      Jamel Jasso  [address] [facility] [address]        Dear Jamel,     This is a reminder to schedule an office visit with a primary care provider. Please call [phone] if you have questions about this reminder and/or to schedule an appointment with Dr. Siva Sahni or other primary care provider.     We look forward to seeing you soon.     Clem Carrion RN - PAL (Patient Advocate and Liaison)  [facility]  [phone]

## 2022-12-19 ENCOUNTER — TELEPHONE (OUTPATIENT)
Dept: FAMILY MEDICINE | Facility: CLINIC | Age: 52
End: 2022-12-19

## 2022-12-19 NOTE — TELEPHONE ENCOUNTER
Pt calls.  He needs his pre-op done 12/23 - 12/26.  His surgery is January 19.  [facility] recommended so he can get test results.  He does not know the specifics.  Advised we are closed 12/24 and 12/25.  Not seeing appts in that time frame.      Advised I will send this to the [facility].  He asked that I not do that.  He said he will figure out specifics and call back if needed.

## 2023-01-09 ENCOUNTER — OFFICE VISIT (OUTPATIENT)
Dept: FAMILY MEDICINE | Facility: CLINIC | Age: 53
End: 2023-01-09
Payer: COMMERCIAL

## 2023-01-09 VITALS
TEMPERATURE: 97.5 F | WEIGHT: 215.3 LBS | DIASTOLIC BLOOD PRESSURE: 121 MMHG | BODY MASS INDEX: 28.53 KG/M2 | HEART RATE: 62 BPM | OXYGEN SATURATION: 99 % | RESPIRATION RATE: 18 BRPM | SYSTOLIC BLOOD PRESSURE: 186 MMHG | HEIGHT: 73 IN

## 2023-01-09 DIAGNOSIS — Z01.818 PREOP GENERAL PHYSICAL EXAM: Primary | ICD-10-CM

## 2023-01-09 DIAGNOSIS — M16.12 ARTHRITIS OF LEFT HIP: ICD-10-CM

## 2023-01-09 DIAGNOSIS — I25.10 CORONARY ARTERY DISEASE INVOLVING NATIVE CORONARY ARTERY OF NATIVE HEART WITHOUT ANGINA PECTORIS: ICD-10-CM

## 2023-01-09 DIAGNOSIS — I44.0 FIRST DEGREE AV BLOCK: ICD-10-CM

## 2023-01-09 DIAGNOSIS — E78.2 MIXED HYPERLIPIDEMIA: ICD-10-CM

## 2023-01-09 DIAGNOSIS — I10 ESSENTIAL HYPERTENSION: ICD-10-CM

## 2023-01-09 DIAGNOSIS — Z12.11 SCREEN FOR COLON CANCER: ICD-10-CM

## 2023-01-09 LAB
ANION GAP SERPL CALCULATED.3IONS-SCNC: 12 MMOL/L (ref 7–15)
BUN SERPL-MCNC: 16.3 MG/DL (ref 6–20)
CALCIUM SERPL-MCNC: 8.9 MG/DL (ref 8.6–10)
CHLORIDE SERPL-SCNC: 104 MMOL/L (ref 98–107)
CREAT SERPL-MCNC: 0.92 MG/DL (ref 0.67–1.17)
DEPRECATED HCO3 PLAS-SCNC: 19 MMOL/L (ref 22–29)
ERYTHROCYTE [DISTWIDTH] IN BLOOD BY AUTOMATED COUNT: 12.7 % (ref 10–15)
GFR SERPL CREATININE-BSD FRML MDRD: >90 ML/MIN/1.73M2
GLUCOSE SERPL-MCNC: 112 MG/DL (ref 70–99)
HCT VFR BLD AUTO: 41.2 % (ref 40–53)
HGB BLD-MCNC: 13 G/DL (ref 13.3–17.7)
MCH RBC QN AUTO: 29.1 PG (ref 26.5–33)
MCHC RBC AUTO-ENTMCNC: 31.6 G/DL (ref 31.5–36.5)
MCV RBC AUTO: 92 FL (ref 78–100)
PLATELET # BLD AUTO: 220 10E3/UL (ref 150–450)
POTASSIUM SERPL-SCNC: 3.7 MMOL/L (ref 3.4–5.3)
RBC # BLD AUTO: 4.47 10E6/UL (ref 4.4–5.9)
SODIUM SERPL-SCNC: 135 MMOL/L (ref 136–145)
WBC # BLD AUTO: 3.7 10E3/UL (ref 4–11)

## 2023-01-09 PROCEDURE — 80048 BASIC METABOLIC PNL TOTAL CA: CPT | Performed by: PHYSICIAN ASSISTANT

## 2023-01-09 PROCEDURE — 85027 COMPLETE CBC AUTOMATED: CPT | Performed by: PHYSICIAN ASSISTANT

## 2023-01-09 PROCEDURE — 93000 ELECTROCARDIOGRAM COMPLETE: CPT | Performed by: PHYSICIAN ASSISTANT

## 2023-01-09 PROCEDURE — 36415 COLL VENOUS BLD VENIPUNCTURE: CPT | Performed by: PHYSICIAN ASSISTANT

## 2023-01-09 PROCEDURE — 99214 OFFICE O/P EST MOD 30 MIN: CPT | Performed by: PHYSICIAN ASSISTANT

## 2023-01-09 RX ORDER — CHLORTHALIDONE 25 MG/1
25 TABLET ORAL DAILY
Qty: 90 TABLET | Refills: 1 | Status: SHIPPED | OUTPATIENT
Start: 2023-01-09 | End: 2023-07-06

## 2023-01-09 RX ORDER — LISINOPRIL 40 MG/1
40 TABLET ORAL DAILY
Qty: 90 TABLET | Refills: 1 | Status: SHIPPED | OUTPATIENT
Start: 2023-01-09 | End: 2023-07-06

## 2023-01-09 RX ORDER — ATORVASTATIN CALCIUM 80 MG/1
80 TABLET, FILM COATED ORAL DAILY
Qty: 90 TABLET | Refills: 1 | Status: SHIPPED | OUTPATIENT
Start: 2023-01-09 | End: 2023-07-06

## 2023-01-09 RX ORDER — METOPROLOL SUCCINATE 50 MG/1
50 TABLET, EXTENDED RELEASE ORAL DAILY
Qty: 90 TABLET | Refills: 1 | Status: SHIPPED | OUTPATIENT
Start: 2023-01-09 | End: 2023-07-06

## 2023-01-09 NOTE — LETTER
January 10, 2023      Jamel Jasso  [address] [facility] [address]        Dear ,    We are writing to inform you of your test results.    Your sodium is slightly low, but not a concern. Your hemoglobin and white blood cell count was a bit low. We should recheck this in near future. No concerns for surgery.     -gary contreras, pac      Resulted Orders   BASIC METABOLIC PANEL   Result Value Ref Range    Sodium 135 (L) 136 - 145 mmol/L    Potassium 3.7 3.4 - 5.3 mmol/L    Chloride 104 98 - 107 mmol/L    Carbon Dioxide (CO2) 19 (L) 22 - 29 mmol/L    Anion Gap 12 7 - 15 mmol/L    Urea Nitrogen 16.3 6.0 - 20.0 mg/dL    Creatinine 0.92 0.67 - 1.17 mg/dL    Calcium 8.9 8.6 - 10.0 mg/dL    Glucose 112 (H) 70 - 99 mg/dL    GFR Estimate >90 >60 mL/min/1.73m2      Comment:      Effective December 21, 2021 eGFRcr in adults is calculated using the 2021 CKD-EPI creatinine equation which includes age and gender (Micheal et al., NEJ, DOI: 10.1056/OCPInq3052875)   CBC with platelets   Result Value Ref Range    WBC Count 3.7 (L) 4.0 - 11.0 10e3/uL    RBC Count 4.47 4.40 - 5.90 10e6/uL    Hemoglobin 13.0 (L) 13.3 - 17.7 g/dL    Hematocrit 41.2 40.0 - 53.0 %    MCV 92 78 - 100 fL    MCH 29.1 26.5 - 33.0 pg    MCHC 31.6 31.5 - 36.5 g/dL    RDW 12.7 10.0 - 15.0 %    Platelet Count 220 150 - 450 10e3/uL       If you have any questions or concerns, please call the clinic at the number listed above.       Sincerely,      Tesfaye Contreras PA-C

## 2023-01-09 NOTE — PATIENT INSTRUCTIONS
Hold chlothalidone and lisinopril day of surgery. Hold nsaids and aspirin 7 days prior to surgery.  All other medications taken as prescribed.       For informational purposes only. Not to replace the advice of your health care provider. Copyright   ,  [facility] [employer] [facility]. All rights reserved. Clinically reviewed by Angi Malagon MD. [place] [id number] - REV .  Preparing for Your Surgery  Getting started  A nurse will call you to review your health history and instructions. They will give you an arrival time based on your scheduled surgery time. Please be ready to share:  Your doctor's clinic name and phone number  Your medical, surgical, and anesthesia history  A list of allergies and sensitivities  A list of medicines, including herbal treatments and over-the-counter drugs  Whether the patient has a legal guardian (ask how to send us the papers in advance)  Please tell us if you're pregnant--or if there's any chance you might be pregnant. Some surgeries may injure a fetus (unborn baby), so they require a pregnancy test. Surgeries that are safe for a fetus don't always need a test, and you can choose whether to have one.   If you have a child who's having surgery, please ask for a copy of Preparing for Your Child's Surgery.    Preparing for surgery  Within 10 to 30 days of surgery: Have a pre-op exam (sometimes called an H&P, or History and Physical). This can be done at a clinic or pre-operative center.  If you're having a , you may not need this exam. Talk to your care team.  At your pre-op exam, talk to your care team about all medicines you take. If you need to stop any medicines before surgery, ask when to start taking them again.  We do this for your safety. Many medicines can make you bleed too much during surgery. Some change how well surgery (anesthesia) drugs work.  Call your insurance company to let them know you're having surgery. (If you don't have insurance, call  [phone].)  Call your clinic if there's any change in your health. This includes signs of a cold or flu (sore throat, runny nose, cough, rash, fever). It also includes a scrape or scratch near the surgery site.  If you have questions on the day of surgery, call your hospital or surgery center.  Eating and drinking guidelines  For your safety: Unless your surgeon tells you otherwise, follow the guidelines below.  Eat and drink as usual until 8 hours before you arrive for surgery. After that, no food or milk.  Drink clear liquids until 2 hours before you arrive. These are liquids you can see through, like water, Gatorade, and Propel Water. They also include plain black coffee and tea (no cream or milk), candy, and breath mints. You can spit out gum when you arrive.  If you drink alcohol: Stop drinking it the night before surgery.  If your care team tells you to take medicine on the morning of surgery, it's okay to take it with a sip of water.  Preventing infection  Shower or bathe the night before and morning of your surgery. Follow the instructions your clinic gave you. (If no instructions, use regular soap.)  Don't shave or clip hair near your surgery site. We'll remove the hair if needed.  Don't smoke or vape the morning of surgery. You may chew nicotine gum up to 2 hours before surgery. A nicotine patch is okay.  Note: Some surgeries require you to completely quit smoking and nicotine. Check with your surgeon.  Your care team will make every effort to keep you safe from infection. We will:  Clean our hands often with soap and water (or an alcohol-based hand rub).  Clean the skin at your surgery site with a special soap that kills germs.  Give you a special gown to keep you warm. (Cold raises the risk of infection.)  Wear special hair covers, masks, gowns and gloves during surgery.  Give antibiotic medicine, if prescribed. Not all surgeries need antibiotics.  What to bring on the day of surgery  Photo ID and  insurance card  Copy of your health care directive, if you have one  Glasses and hearing aids (bring cases)  You can't wear contacts during surgery  Inhaler and eye drops, if you use them (tell us about these when you arrive)  CPAP machine or breathing device, if you use them  A few personal items, if spending the night  If you have . . .  A pacemaker, ICD (cardiac defibrillator) or other implant: Bring the ID card.  An implanted stimulator: Bring the remote control.  A legal guardian: Bring a copy of the certified (court-stamped) guardianship papers.  Please remove any jewelry, including body piercings. Leave jewelry and other valuables at home.  If you're going home the day of surgery  You must have a responsible adult drive you home. They should stay with you overnight as well.  If you don't have someone to stay with you, and you aren't safe to go home alone, we may keep you overnight. Insurance often won't pay for this.  After surgery  If it's hard to control your pain or you need more pain medicine, please call your surgeon's office.  Questions?   If you have any questions for your care team, list them here: _________________________________________________________________________________________________________________________________________________________________________ ____________________________________ ____________________________________ ____________________________________

## 2023-01-09 NOTE — PROGRESS NOTES
[facility]  [id number] [place] [id number]  Phone: [phone]  Primary Provider: Phillip Brush  Pre-op Performing Provider: PHILLIP BRUSH      PREOPERATIVE EVALUATION:  Today's date: 1/9/2023    Jamel Jasso is a 52 year old male who presents for a preoperative evaluation.    Surgical Information:  Surgery/Procedure: Left hip replacement  Surgery Location: [facility]   Surgeon: Dr. Mathur  Surgery Date: 1/19/2023  Time of Surgery: TBD  Where patient plans to recover: At home with family  Fax number for surgical facility: unknown    Type of Anesthesia Anticipated: General    Assessment & Plan     The proposed surgical procedure is considered INTERMEDIATE risk.    Preop general physical exam  Uncontrolled by. Not on medications. Will need this controlled prior to surgery. To return for nurse only visit.   - EKG 12-lead complete w/read - Clinics  - CBC with platelets; Future  - CBC with platelets    Arthritis of left hip  As above. If bp normalizes, cleared for surgery.     Screen for colon cancer  Due     Essential hypertension  As above.   - BASIC METABOLIC PANEL; Future  - metoprolol succinate ER (TOPROL XL) 50 MG 24 hr tablet; Take 1 tablet (50 mg) by mouth daily  - lisinopril (ZESTRIL) 40 MG tablet; Take 1 tablet (40 mg) by mouth daily  - chlorthalidone (HYGROTON) 25 MG tablet; Take 1 tablet (25 mg) by mouth daily  - BASIC METABOLIC PANEL  Medication use and side effects discussed with the patient. Patient is in complete understanding and agreement with plan.     Coronary artery disease involving native coronary artery of native heart without angina pectoris  Questionable past history. In 2021 stress testing showed no ischemia findings. Does have first degree av block. However, EKG today showed no other new changes suggesting new ischemia and has been present in years past. Recommending establishing with  cardiology for second opinion. From preoperative standpoint, no findings suggesting surgery should be postponed. Continue high dose statin. Hold asa for 1 week prior.   - atorvastatin (LIPITOR) 80 MG tablet; Take 1 tablet (80 mg) by mouth daily  - EKG 12-lead complete w/read - Clinics  - Adult Cardiology Eval [facility] Referral; Future    Mixed hyperlipidemia  As above   - atorvastatin (LIPITOR) 80 MG tablet; Take 1 tablet (80 mg) by mouth daily    First degree AV block  As above   - Adult Cardiology Eval [facility] Referral; Future           Risks and Recommendations:  The patient has the following additional risks and recommendations for perioperative complications:   - No identified additional risk factors other than previously addressed    Medication Instructions:  Patient is to take all scheduled medications on the day of surgery EXCEPT for modifications listed below:   - aspirin: Discontinue aspirin 7-10 days prior to procedure to reduce bleeding risk. It should be resumed postoperatively.    - ACE/ARB: HOLD due to exceptional risk of hypotension during surgery.    - Beta Blockers: Continue taking on the day of surgery.   - Diuretics: HOLD on the day of surgery.    RECOMMENDATION:  APPROVAL NOT GIVEN. Needing confirmation of acceptable bp after medications restarted prior to surgery.         Subjective     HPI related to upcoming procedure: history of right hip arhtritis. The above procedure was deemed the next best step in management.     Preop Questions 1/9/2023   1. Have you ever had a heart attack or stroke? No   2. Have you ever had surgery on your heart or blood vessels, such as a stent placement, a coronary artery bypass, or surgery on an artery in your head, neck, heart, or legs? No   3. Do you have chest pain with activity? No   4. Do you have a history of  heart failure? No   5. Do you currently have a cold, bronchitis or symptoms of other infection? No   6. Do you have a cough, shortness of breath,  or wheezing? No   7. Do you or anyone in your family have previous history of blood clots? No   8. Do you or does anyone in your family have a serious bleeding problem such as prolonged bleeding following surgeries or cuts? No   9. Have you ever had problems with anemia or been told to take iron pills? No   10. Have you had any abnormal blood loss such as black, tarry or bloody stools? No   11. Have you ever had a blood transfusion? No   12. Are you willing to have a blood transfusion if it is medically needed before, during, or after your surgery? Yes   13. Have you or any of your relatives ever had problems with anesthesia? No   14. Do you have sleep apnea, excessive snoring or daytime drowsiness? No   15. Do you have any artifical heart valves or other implanted medical devices like a pacemaker, defibrillator, or continuous glucose monitor? No   16. Do you have artificial joints? YES - right hip   17. Are you allergic to latex? No     Health Care Directive:  Patient does not have a Health Care Directive or Living Will: Discussed advance care planning with patient; information given to patient to review.    Preoperative Review of :   reviewed - no record of controlled substances prescribed.      Status of Chronic Conditions:  See problem list for active medical problems.  Problems all longstanding and stable, except as noted/documented.  See ROS for pertinent symptoms related to these conditions.    CAD -CAD - past history of nstemi. However, no evidence of ischemia seen on past stress testing . Last Stress test 9/29/21, EKG 1/9/23.     HYPERLIPIDEMIA - Patient has a long history of significant Hyperlipidemia requiring medication for treatment with recent fair control. Patient reports no problems or side effects with the medication. Though has not been on for months.     HYPERTENSION - Patient has longstanding history of HTN , currently denies any symptoms referable to elevated blood pressure. Specifically  denies chest pain, palpitations, dyspnea, orthopnea, PND or peripheral edema. Blood pressure readings have not been in normal range. Current medication regimen is as listed below. Patient denies any side effects of medication.  Has not taken for months.       Review of Systems: right hip pain.   CONSTITUTIONAL: NEGATIVE for fever, chills, change in weight  INTEGUMENTARY/SKIN: NEGATIVE for worrisome rashes, moles or lesions  EYES: NEGATIVE for vision changes or irritation  ENT/MOUTH: NEGATIVE for ear, mouth and throat problems  RESP: NEGATIVE for significant cough or SOB  CV: NEGATIVE for chest pain, palpitations or peripheral edema  GI: NEGATIVE for nausea, abdominal pain, heartburn, or change in bowel habits  : NEGATIVE for frequency, dysuria, or hematuria  MUSCULOSKELETAL: NEGATIVE for significant arthralgias or myalgia  NEURO: NEGATIVE for weakness, dizziness or paresthesias  ENDOCRINE: NEGATIVE for temperature intolerance, skin/hair changes  HEME: NEGATIVE for bleeding problems  PSYCHIATRIC: NEGATIVE for changes in mood or affect    Patient Active Problem List    Diagnosis Date Noted     Coronary artery disease involving native coronary artery of native heart without angina pectoris 01/09/2023     Priority: Medium     Mixed hyperlipidemia 01/09/2023     Priority: Medium     First degree AV block 01/09/2023     Priority: Medium     LVH (left ventricular hypertrophy) 09/23/2021     Priority: Medium     Essential hypertension 09/23/2021     Priority: Medium     Adenomatous polyp of colon 10/20/2020     Priority: Medium     Hypertensive emergency 10/08/2020     Priority: Medium     Hypertensive urgency 10/08/2020     Priority: Medium      Past Medical History:   Diagnosis Date     Coronary artery disease involving native coronary artery of native heart without angina pectoris 1/9/2023     History reviewed. No pertinent surgical history.  Current Outpatient Medications   Medication Sig Dispense Refill     aspirin  "(ASA) 81 MG EC tablet Take 1 tablet (81 mg) by mouth daily       atorvastatin (LIPITOR) 80 MG tablet Take 1 tablet (80 mg) by mouth daily 90 tablet 1     chlorthalidone (HYGROTON) 25 MG tablet Take 1 tablet (25 mg) by mouth daily 90 tablet 1     ferrous fumarate 65 mg, [place]. FE,-Vitamin C 125 mg (VITRON C)  MG TABS tablet Take 1 tablet by mouth daily       lisinopril (ZESTRIL) 40 MG tablet Take 1 tablet (40 mg) by mouth daily 90 tablet 1     metoprolol succinate ER (TOPROL XL) 50 MG 24 hr tablet Take 1 tablet (50 mg) by mouth daily 90 tablet 1     multivitamin w/minerals (THERA-VIT-M) tablet TAKE ONE TABLET BY MOUTH ONCE DAILY         Allergies   Allergen Reactions     Shellfish Allergy Anaphylaxis     Bicillin C-R,      Other reaction(s): *Unknown     Penicillins         Social History     Tobacco Use     Smoking status: Never     Smokeless tobacco: Never   Substance Use Topics     Alcohol use: Yes     Comment: 1x week     Family History   Family history unknown: Yes     History   Drug Use Unknown         Objective     BP (!) 186/121 (BP Location: Right arm, Patient Position: Sitting, Cuff Size: Adult Regular)   Pulse 62   Temp 97.5  F (36.4  C)   Resp 18   Ht 1.854 m (6' 1\")   Wt 97.7 kg (215 lb 4.8 oz)   SpO2 99%   BMI 28.41 kg/m      Physical Exam    GENERAL APPEARANCE: healthy, alert and no distress     EYES: EOMI, PERRL     HENT: ear canals and TM's normal and nose and mouth without ulcers or lesions     NECK: no adenopathy, no asymmetry, masses, or scars and thyroid normal to palpation     RESP: lungs clear to auscultation - no rales, rhonchi or wheezes     CV: regular rates and rhythm, normal S1 S2, no S3 or S4 and no murmur, click or rub     ABDOMEN:  soft, nontender, no HSM or masses and bowel sounds normal     MS: extremities normal- no gross deformities noted, no evidence of inflammation in joints, FROM in all extremities.     SKIN: no suspicious lesions or rashes     NEURO: Normal strength " and tone, sensory exam grossly normal, mentation intact and speech normal     PSYCH: mentation appears normal. and affect normal/bright     LYMPHATICS: No cervical adenopathy    Recent Labs   Lab Test 03/01/22  1411 09/23/21  0935   HGB 13.7  --      --     136   POTASSIUM 4.0 3.7   CR 1.25 1.05        Diagnostics:  Labs pending at this time.  Results will be reviewed when available.  Recent Results (from the past 24 hour(s))   BASIC METABOLIC PANEL    Collection Time: 01/09/23  8:41 AM   Result Value Ref Range    Sodium 135 (L) 136 - 145 mmol/L    Potassium 3.7 3.4 - 5.3 mmol/L    Chloride 104 98 - 107 mmol/L    Carbon Dioxide (CO2) 19 (L) 22 - 29 mmol/L    Anion Gap 12 7 - 15 mmol/L    Urea Nitrogen 16.3 6.0 - 20.0 mg/dL    Creatinine 0.92 0.67 - 1.17 mg/dL    Calcium 8.9 8.6 - 10.0 mg/dL    Glucose 112 (H) 70 - 99 mg/dL    GFR Estimate >90 >60 mL/min/1.73m2   CBC with platelets    Collection Time: 01/09/23  8:41 AM   Result Value Ref Range    WBC Count 3.7 (L) 4.0 - 11.0 10e3/uL    RBC Count 4.47 4.40 - 5.90 10e6/uL    Hemoglobin 13.0 (L) 13.3 - 17.7 g/dL    Hematocrit 41.2 40.0 - 53.0 %    MCV 92 78 - 100 fL    MCH 29.1 26.5 - 33.0 pg    MCHC 31.6 31.5 - 36.5 g/dL    RDW 12.7 10.0 - 15.0 %    Platelet Count 220 150 - 450 10e3/uL      EKG: sinus bradycardia, LVH pressent. first degree block. since previous, first degree block is returned. was present on 10/8/2020 ekg    Revised Cardiac Risk Index (RCRI):  The patient has the following serious cardiovascular risks for perioperative complications:   - Coronary Artery Disease (MI, positive stress test, angina, Qs on EKG) = 1 point     RCRI Interpretation: 1 point: Class II (low risk - 0.9% complication rate)           Signed Electronically by: Tesfaye Luis Diane, PA-C  Copy of this evaluation report is provided to requesting physician.

## 2023-01-10 ENCOUNTER — HOSPITAL ENCOUNTER (EMERGENCY)
Facility: CLINIC | Age: 53
Discharge: HOME | End: 2023-01-11
Attending: EMERGENCY MEDICINE | Admitting: EMERGENCY MEDICINE
Payer: COMMERCIAL

## 2023-01-10 ENCOUNTER — TELEPHONE (OUTPATIENT)
Dept: FAMILY MEDICINE | Facility: CLINIC | Age: 53
End: 2023-01-10

## 2023-01-10 DIAGNOSIS — I10 ESSENTIAL HYPERTENSION: ICD-10-CM

## 2023-01-10 DIAGNOSIS — Z01.818 PREOP GENERAL PHYSICAL EXAM: Primary | ICD-10-CM

## 2023-01-10 DIAGNOSIS — R79.89 LOW SERUM SODIUM: ICD-10-CM

## 2023-01-10 DIAGNOSIS — I16.0 HYPERTENSIVE URGENCY: ICD-10-CM

## 2023-01-10 LAB
ANION GAP SERPL CALCULATED.3IONS-SCNC: 13 MMOL/L (ref 7–15)
BASOPHILS # BLD AUTO: 0 10E3/UL (ref 0–0.2)
BASOPHILS NFR BLD AUTO: 0 %
BUN SERPL-MCNC: 17.8 MG/DL (ref 6–20)
CALCIUM SERPL-MCNC: 10 MG/DL (ref 8.6–10)
CHLORIDE SERPL-SCNC: 101 MMOL/L (ref 98–107)
CREAT SERPL-MCNC: 1.14 MG/DL (ref 0.67–1.17)
DEPRECATED HCO3 PLAS-SCNC: 24 MMOL/L (ref 22–29)
EOSINOPHIL # BLD AUTO: 0.1 10E3/UL (ref 0–0.7)
EOSINOPHIL NFR BLD AUTO: 2 %
ERYTHROCYTE [DISTWIDTH] IN BLOOD BY AUTOMATED COUNT: 12.5 % (ref 10–15)
GFR SERPL CREATININE-BSD FRML MDRD: 77 ML/MIN/1.73M2
GLUCOSE SERPL-MCNC: 94 MG/DL (ref 70–99)
HCT VFR BLD AUTO: 43.4 % (ref 40–53)
HGB BLD-MCNC: 14.2 G/DL (ref 13.3–17.7)
HOLD SPECIMEN: NORMAL
HOLD SPECIMEN: NORMAL
IMM GRANULOCYTES # BLD: 0 10E3/UL
IMM GRANULOCYTES NFR BLD: 1 %
LYMPHOCYTES # BLD AUTO: 2.4 10E3/UL (ref 0.8–5.3)
LYMPHOCYTES NFR BLD AUTO: 36 %
MCH RBC QN AUTO: 29.2 PG (ref 26.5–33)
MCHC RBC AUTO-ENTMCNC: 32.7 G/DL (ref 31.5–36.5)
MCV RBC AUTO: 89 FL (ref 78–100)
MONOCYTES # BLD AUTO: 0.5 10E3/UL (ref 0–1.3)
MONOCYTES NFR BLD AUTO: 8 %
NEUTROPHILS # BLD AUTO: 3.5 10E3/UL (ref 1.6–8.3)
NEUTROPHILS NFR BLD AUTO: 53 %
NRBC # BLD AUTO: 0 10E3/UL
NRBC BLD AUTO-RTO: 0 /100
PLATELET # BLD AUTO: 296 10E3/UL (ref 150–450)
POTASSIUM SERPL-SCNC: 4 MMOL/L (ref 3.4–5.3)
RBC # BLD AUTO: 4.86 10E6/UL (ref 4.4–5.9)
SODIUM SERPL-SCNC: 138 MMOL/L (ref 136–145)
TROPONIN T SERPL HS-MCNC: 32 NG/L
WBC # BLD AUTO: 6.6 10E3/UL (ref 4–11)

## 2023-01-10 PROCEDURE — 85025 COMPLETE CBC W/AUTO DIFF WBC: CPT | Performed by: EMERGENCY MEDICINE

## 2023-01-10 PROCEDURE — 84484 ASSAY OF TROPONIN QUANT: CPT | Performed by: EMERGENCY MEDICINE

## 2023-01-10 PROCEDURE — 99284 EMERGENCY DEPT VISIT MOD MDM: CPT

## 2023-01-10 PROCEDURE — 80048 BASIC METABOLIC PNL TOTAL CA: CPT | Performed by: EMERGENCY MEDICINE

## 2023-01-10 PROCEDURE — 93005 ELECTROCARDIOGRAM TRACING: CPT

## 2023-01-10 PROCEDURE — 250N000013 HC RX MED GY IP 250 OP 250 PS 637: Performed by: EMERGENCY MEDICINE

## 2023-01-10 PROCEDURE — 36415 COLL VENOUS BLD VENIPUNCTURE: CPT | Performed by: EMERGENCY MEDICINE

## 2023-01-10 RX ORDER — AMLODIPINE BESYLATE 5 MG/1
10 TABLET ORAL ONCE
Status: COMPLETED | OUTPATIENT
Start: 2023-01-10 | End: 2023-01-10

## 2023-01-10 RX ORDER — AMLODIPINE BESYLATE 5 MG/1
10 TABLET ORAL DAILY
Qty: 30 TABLET | Refills: 0 | Status: SHIPPED | OUTPATIENT
Start: 2023-01-10 | End: 2023-01-25

## 2023-01-10 RX ADMIN — AMLODIPINE BESYLATE 10 MG: 5 TABLET ORAL at 23:56

## 2023-01-10 NOTE — TELEPHONE ENCOUNTER
Leigh calling from [facility] [phone] OK to L/M.  Wondering if Bora can put order in for BMP due to restarting bp med and low sodium.  Procedure on the 19th.  Please advise.  No need to call back.  They have access to view result.  Carline Sahu RN

## 2023-01-11 ENCOUNTER — TELEPHONE (OUTPATIENT)
Dept: FAMILY MEDICINE | Facility: CLINIC | Age: 53
End: 2023-01-11

## 2023-01-11 VITALS
DIASTOLIC BLOOD PRESSURE: 93 MMHG | HEART RATE: 62 BPM | OXYGEN SATURATION: 100 % | RESPIRATION RATE: 16 BRPM | TEMPERATURE: 98.5 F | SYSTOLIC BLOOD PRESSURE: 152 MMHG

## 2023-01-11 LAB
ATRIAL RATE - MUSE: 62 BPM
DIASTOLIC BLOOD PRESSURE - MUSE: NORMAL MMHG
INTERPRETATION ECG - MUSE: NORMAL
P AXIS - MUSE: 24 DEGREES
PR INTERVAL - MUSE: 260 MS
QRS DURATION - MUSE: 92 MS
QT - MUSE: 402 MS
QTC - MUSE: 408 MS
R AXIS - MUSE: 10 DEGREES
SYSTOLIC BLOOD PRESSURE - MUSE: NORMAL MMHG
T AXIS - MUSE: 64 DEGREES
TROPONIN T SERPL HS-MCNC: 35 NG/L
VENTRICULAR RATE- MUSE: 62 BPM

## 2023-01-11 NOTE — ED PROVIDER NOTES
History     Chief Complaint:  Hypertension       HPI   Jamel Jasso is a 52 year old male who presents with elevated blood pressure.  Patient reports that it was noted he had high blood pressure at pre-op appointment for L hip replacement.  He denies CP, SOB, numbness or weakness in extremities, speech disturbance, vision disturbance, headaches.he has not been routinely monitoring his blood pressure at home.  He reports that he has been taking the lisinopril and chlorthalidone and metoprolol as prescribed but has not been taking amlodipine which is recommended to him previously.      ROS:  Review of Systems  A 10 point ROS was obtained and negative except as noted here and in HPI    Allergies:  Shellfish Allergy  Bicillin C-R,  Penicillins     Medications:    amLODIPine (NORVASC) 5 MG tablet  aspirin (ASA) 81 MG EC tablet  atorvastatin (LIPITOR) 80 MG tablet  chlorthalidone (HYGROTON) 25 MG tablet  ferrous fumarate 65 mg, [place]. FE,-Vitamin C 125 mg (VITRON C)  MG TABS tablet  lisinopril (ZESTRIL) 40 MG tablet  metoprolol succinate ER (TOPROL XL) 50 MG 24 hr tablet  multivitamin w/minerals (THERA-VIT-M) tablet        Past Medical History:    Past Medical History:   Diagnosis Date     Coronary artery disease involving native coronary artery of native heart without angina pectoris 1/9/2023       Past Surgical History:    No past surgical history on file.     Family History:    Family history is unknown by patient.    Social History:   reports that he has never smoked. He has never used smokeless tobacco. He reports current alcohol use. He reports that he does not use drugs.  PCP: Tesfaye Contreras     Physical Exam     Patient Vitals for the past 24 hrs:   BP Temp Temp src Pulse Resp SpO2   01/11/23 0023 (!) 152/93 -- -- -- -- --   01/10/23 2154 (!) 175/114 -- -- 62 16 100 %   01/10/23 1939 (!) 200/128 98.5  F (36.9  C) Temporal 67 18 100 %        Physical Exam  VS: Reviewed per above  HENT: Mucous membranes  moist, no nuchal rigidity  EYES: sclera anicteric  CV: Rate as noted, regular rhythm.   RESP: Effort normal. Breath sounds are normal bilaterally.  GI: no tenderness/rebound/guarding, not distended.  NEURO: GCS 15, cranial nerves II through XII are intact, 5 out of 5 strength in all 4 extremities, sensation is intact light touch in all 4 extremities  MSK: No deformity of the extremities  SKIN: Warm and dry    Emergency Department Course   ECG:  ECG results from 01/10/23   EKG 12-lead, tracing only     Value    Systolic Blood Pressure     Diastolic Blood Pressure     Ventricular Rate 62    Atrial Rate 62    UT Interval 260    QRS Duration 92        QTc 408    P Axis 24    R AXIS 10    T Axis 64    Interpretation ECG      Sinus rhythm with 1st degree A-V block  Voltage criteria for left ventricular hypertrophy  Nonspecific T wave abnormality  Abnormal ECG  When compared with ECG of 29-SEP-2021 11:30,  UT interval has increased  Vent. rate has decreased BY  36 BPM         Laboratory:  Labs Ordered and Resulted from Time of ED Arrival to Time of ED Departure   TROPONIN T, HIGH SENSITIVITY - Abnormal       Result Value    Troponin T, High Sensitivity 32 (*)    TROPONIN T, HIGH SENSITIVITY - Abnormal    Troponin T, High Sensitivity 35 (*)    BASIC METABOLIC PANEL - Normal    Sodium 138      Potassium 4.0      Chloride 101      Carbon Dioxide (CO2) 24      Anion Gap 13      Urea Nitrogen 17.8      Creatinine 1.14      Calcium 10.0      Glucose 94      GFR Estimate 77     CBC WITH PLATELETS AND DIFFERENTIAL    WBC Count 6.6      RBC Count 4.86      Hemoglobin 14.2      Hematocrit 43.4      MCV 89      MCH 29.2      MCHC 32.7      RDW 12.5      Platelet Count 296      % Neutrophils 53      % Lymphocytes 36      % Monocytes 8      % Eosinophils 2      % Basophils 0      % Immature Granulocytes 1      NRBCs per 100 WBC 0      Absolute Neutrophils 3.5      Absolute Lymphocytes 2.4      Absolute Monocytes 0.5      Absolute  Eosinophils 0.1      Absolute Basophils 0.0      Absolute Immature Granulocytes 0.0      Absolute NRBCs 0.0            Emergency Department Course & Assessments:      Interventions:  Medications   amLODIPine (NORVASC) tablet 10 mg (10 mg Oral Given 1/10/23 9847)        Disposition:  The patient was discharged to home.       Medical Decision Making:  Patient presents to the ER for evaluation of elevated blood pressure.  On arrival blood pressure is up to 200/128.  No evidence of hypertensive emergency on laboratory testing or exam.  There is mild troponin elevation which is stable on recheck.  No chest pains or shortness of breath to suggest ACS.  Blood pressure did improve even before amlodipine given in the ER we have likely had any significant effect on blood pressure.  I suspect patient is suffering from hypertensive urgency.  I will refill his amlodipine prescription to take in addition to his other antihypertensives.  Recommended ongoing outpatient primary care follow-up to evaluate response to amlodipine or lack thereof.  Return precautions discussed prior to discharge.      Diagnosis:    ICD-10-CM    1. Hypertensive urgency  I16.0            Discharge Medications:  Discharge Medication List as of 1/11/2023 12:16 AM      START taking these medications    Details   amLODIPine (NORVASC) 5 MG tablet Take 2 tablets (10 mg) by mouth daily, Disp-30 tablet, R-0, Local Print              1/10/2023   Jacob Medina MD Lindenbaum, Elan, MD  01/11/23 0054

## 2023-01-11 NOTE — ED TRIAGE NOTES
Here for concern of high blood pressure for about 5 days, tonight was 178/114. Is taking chlorthalidone, lisinopril, and metoprolol. Missed yesterday dose because have to get it prescribed again; otherwise is taking medication as directed. ABCs intact.     Triage Assessment     Row Name 01/10/23 1938       Triage Assessment (Adult)    Airway WDL WDL       Respiratory WDL    Respiratory WDL WDL       Cardiac WDL    Cardiac WDL WDL

## 2023-01-11 NOTE — TELEPHONE ENCOUNTER
New Medication Request        What medication are you requesting?: Amlodipine and cera bite    Reason for medication request: Patient states he had a pre op for his hip replacement on 1-19. He was into ER for his blood pressure and will be rescheduling his surgery. . Patient was given a 2 week supply of amlodipine and cera bite to start. He would like a call to discuss refill.       Have you taken this medication before?: Yes:     Controlled Substance Agreement on file:   CSA -- Patient Level:    CSA: None found at the patient level.         Patient offered an appointment? No    Preferred Pharmacy:   [facility] [place] [id number] [address]  Phone: [phone] Fax: [phone]      Okay to leave a detailed message?: Yes at Home number on file [phone] (home) or my chart

## 2023-01-11 NOTE — TELEPHONE ENCOUNTER
Called patient.  He states he did not ask for refill.  Scheduled follow-up for 1/25/23 7:10 am.  Nothing additional needed at this time.  Carline Sahu RN

## 2023-01-13 ENCOUNTER — OFFICE VISIT (OUTPATIENT)
Dept: CARDIOLOGY | Facility: CLINIC | Age: 53
End: 2023-01-13
Attending: PHYSICIAN ASSISTANT
Payer: COMMERCIAL

## 2023-01-13 VITALS
SYSTOLIC BLOOD PRESSURE: 110 MMHG | HEIGHT: 74 IN | WEIGHT: 210.8 LBS | DIASTOLIC BLOOD PRESSURE: 73 MMHG | OXYGEN SATURATION: 99 % | HEART RATE: 68 BPM | BODY MASS INDEX: 27.05 KG/M2

## 2023-01-13 DIAGNOSIS — I44.0 FIRST DEGREE AV BLOCK: ICD-10-CM

## 2023-01-13 DIAGNOSIS — I25.10 CORONARY ARTERY DISEASE INVOLVING NATIVE CORONARY ARTERY OF NATIVE HEART WITHOUT ANGINA PECTORIS: ICD-10-CM

## 2023-01-13 PROCEDURE — 93000 ELECTROCARDIOGRAM COMPLETE: CPT | Performed by: INTERNAL MEDICINE

## 2023-01-13 PROCEDURE — 99204 OFFICE O/P NEW MOD 45 MIN: CPT | Performed by: INTERNAL MEDICINE

## 2023-01-13 NOTE — LETTER
1/13/2023    Tesfaye Contreras PA-C  [id number]  [facility] [address]    RE: Jamel Jasso       Dear Colleague,     I had the pleasure of seeing Jamel Jasso in the [facility].  HPI and Plan:   Today I had the pleasure of seeing Jamel Jasso at [facility]. He is a pleasant 52 year old patient with a past medical history of hypertension, first-degree AV block and hyperlipidemia who presents to the clinic in initial consultation for preoperative cardiovascular risk stratification before undergoing elective left hip replacement due to questionable history of coronary disease. Patient had a Lexiscan in 11/2020 which was negative for ischemia.  EF was 60%.    Today, he is accompanied by his partner.  He tells me he has never had any heart attacks in the past.  The stress test performed in 2020 was 4 atypical chest pain and symptoms have since resolved.  He is able to do moderate amount of exertion without any difficulty.  He has never noticed shortness of breath or chest pressure.  Echocardiogram shows normal biventricular systolic function.  He has severe LVH which is also reflected on the EKG which shows LVH with repolarization changes. EKG also shows 1 AVB.     Assessment and plan  1.  Preoperative cardiovascular risk ratification before elective left hip replacement  2.  Essential hypertension- LVH with repol on EKG, severe LVH on echo  3.  Hyperlipidemia-on Lipitor    I had a long discussion with the patient about his preoperative risk.  I told him that given there is nothing to suggest that he has obstructive coronary disease is low risk of cardiovascular complication in perioperative period is low.  However, it is never 0.  I would recommend a regular cardiac precautions.  In addition, I do not believe he needs to have any further ischemic work-up done prior to surgery.  We did discuss the need to keep his blood pressure very well controlled to prevent development  of diastolic dysfunction in the future.  I will see him on as-needed basis going forward.    Thank you for allowing me to participate in the care of Jamel Jasso    This note was completed in part using Dragon voice recognition software. Although reviewed after completion, some word and grammatical errors may occur.    Srinivasa Leyva MD  Cardiology    Orders Placed This Encounter   Procedures     EKG 12-lead complete w/read - Clinics (performed today)       No orders of the defined types were placed in this encounter.      There are no discontinued medications.    Encounter Diagnoses   Name Primary?     Coronary artery disease involving native coronary artery of native heart without angina pectoris      First degree AV block        CURRENT MEDICATIONS:  Current Outpatient Medications   Medication Sig Dispense Refill     amLODIPine (NORVASC) 5 MG tablet Take 2 tablets (10 mg) by mouth daily 30 tablet 0     aspirin (ASA) 81 MG EC tablet Take 1 tablet (81 mg) by mouth daily       atorvastatin (LIPITOR) 80 MG tablet Take 1 tablet (80 mg) by mouth daily 90 tablet 1     chlorthalidone (HYGROTON) 25 MG tablet Take 1 tablet (25 mg) by mouth daily 90 tablet 1     ferrous fumarate 65 mg, [place]. FE,-Vitamin C 125 mg (VITRON C)  MG TABS tablet Take 1 tablet by mouth daily       lisinopril (ZESTRIL) 40 MG tablet Take 1 tablet (40 mg) by mouth daily 90 tablet 1     metoprolol succinate ER (TOPROL XL) 50 MG 24 hr tablet Take 1 tablet (50 mg) by mouth daily 90 tablet 1     multivitamin w/minerals (THERA-VIT-M) tablet TAKE ONE TABLET BY MOUTH ONCE DAILY         ALLERGIES     Allergies   Allergen Reactions     Shellfish Allergy Anaphylaxis     Bicillin C-R,      Other reaction(s): *Unknown     Penicillins        PAST MEDICAL HISTORY:  Past Medical History:   Diagnosis Date     Coronary artery disease involving native coronary artery of native heart without angina pectoris 1/9/2023       PAST SURGICAL HISTORY:  No past surgical  history on file.    FAMILY HISTORY:  Family History   Family history unknown: Yes       SOCIAL HISTORY:  Social History     Socioeconomic History     Marital status:      Spouse name: None     Number of children: None     Years of education: None     Highest education level: None   Tobacco Use     Smoking status: Never     Smokeless tobacco: Never   Vaping Use     Vaping Use: Never used   Substance and Sexual Activity     Alcohol use: Yes     Comment: 1x week     Drug use: Never     Sexual activity: Yes     Social Determinants of Health     Financial Resource Strain: Low Risk      Difficulty of Paying Living Expenses: Not hard at all   Food Insecurity: No Food Insecurity     Worried About Running Out of Food in the Last Year: Never true     Ran Out of Food in the Last Year: Never true   Transportation Needs: No Transportation Needs     Lack of Transportation (Medical): No     Lack of Transportation (Non-Medical): No   Physical Activity: Insufficiently Active     Days of Exercise per Week: 2 days     Minutes of Exercise per Session: 20 min   Stress: No Stress Concern Present     Feeling of Stress : Not at all   Social Connections: Moderately Integrated     Frequency of Communication with Friends and Family: Twice a week     Frequency of Social Gatherings with Friends and Family: Once a week     Attends [employer]ri[employer] Services: More than 4 times per year     Active Member of Clubs or Organizations: No     Marital Status:    Housing Stability: Low Risk      Unable to Pay for Housing in the Last Year: No     Number of Places Lived in the Last Year: 1     Unstable Housing in the Last Year: No       Review of Systems:  Skin:  not assessed       Eyes:  not assessed      ENT:  not assessed      Respiratory:  Negative       Cardiovascular:  Negative      Gastroenterology: not assessed      Genitourinary:  not assessed      Musculoskeletal:  not assessed      Neurologic:  not assessed      Psychiatric:  not assessed   "    Heme/Lymph/Imm:  not assessed      Endocrine:  Negative        Physical Exam:  Vitals: /73 (Cuff Size: Adult Regular)   Pulse 68   Ht 1.88 m (6' 2\")   Wt 95.6 kg (210 lb 12.8 oz)   SpO2 99%   BMI 27.07 kg/m    Eyes: No icterus.  Pulmonary: Chest symmetric, lungs clear bilaterally and no crackles, wheezes or rales.  Cardiovascular: RRR with normal S1 and S2, no murmur, JVP normal.  Musculoskeletal: Edema of the lower extremities: None.  Neurologic: Oriented and appropriate without obvious focal deficits.   Psychiatric: Normal affect.     Recent Lab Results:  LIPID RESULTS:  Lab Results   Component Value Date    CHOL 171 06/08/2022    HDL 47 06/08/2022     (H) 06/08/2022    TRIG 73 06/08/2022       LIVER ENZYME RESULTS:  Lab Results   Component Value Date    AST 35 06/08/2022    AST 28 10/08/2020    ALT 36 06/08/2022    ALT 32 10/08/2020       CBC RESULTS:  Lab Results   Component Value Date    WBC 6.6 01/10/2023    WBC 5.2 10/08/2020    RBC 4.86 01/10/2023    RBC 4.61 10/08/2020    HGB 14.2 01/10/2023    HGB 13.7 10/08/2020    HCT 43.4 01/10/2023    HCT 41.7 10/08/2020    MCV 89 01/10/2023    MCV 91 10/08/2020    MCH 29.2 01/10/2023    MCH 29.7 10/08/2020    MCHC 32.7 01/10/2023    MCHC 32.9 10/08/2020    RDW 12.5 01/10/2023    RDW 12.0 10/08/2020     01/10/2023     10/08/2020       BMP RESULTS:  Lab Results   Component Value Date     01/10/2023     10/08/2020    POTASSIUM 4.0 01/10/2023    POTASSIUM 4.0 03/01/2022    POTASSIUM 3.4 10/08/2020    CHLORIDE 101 01/10/2023    CHLORIDE 107 03/01/2022    CHLORIDE 104 10/08/2020    CO2 24 01/10/2023    CO2 21 03/01/2022    CO2 24 10/08/2020    ANIONGAP 13 01/10/2023    ANIONGAP 8 03/01/2022    ANIONGAP 7 10/08/2020    GLC 94 01/10/2023    GLC 91 03/01/2022    GLC 88 10/08/2020    BUN 17.8 01/10/2023    BUN 18 03/01/2022    BUN 12 10/08/2020    CR 1.14 01/10/2023    CR 1.19 10/08/2020    GFRESTIMATED 77 01/10/2023    " GFRESTIMATED 71 10/08/2020    GFRESTBLACK 82 10/08/2020    ELDA 10.0 01/10/2023    ELDA 8.7 10/08/2020        A1C RESULTS:  No results found for: A1C    INR RESULTS:  No results found for: INR    CC  Tesfaye Contreras PA-C  [id number] [place] [address]    All medical records were reviewed in detail and discussed with the patient. Greater than 30 mins were spent with the patient, 50% of this time was spent on counseling and coordination of care.  After visit summary was printed and given to the patient.    Thank you for allowing me to participate in the care of your patient.      Sincerely,     Srinivasa Leyva MD     [facility]

## 2023-01-14 NOTE — PROGRESS NOTES
HPI and Plan:   Today I had the pleasure of seeing Jamel Jasso at [facility]. He is a pleasant 52 year old patient with a past medical history of hypertension, first-degree AV block and hyperlipidemia who presents to the clinic in initial consultation for preoperative cardiovascular risk stratification before undergoing elective left hip replacement due to questionable history of coronary disease. Patient had a Lexiscan in 11/2020 which was negative for ischemia.  EF was 60%.    Today, he is accompanied by his partner.  He tells me he has never had any heart attacks in the past.  The stress test performed in 2020 was 4 atypical chest pain and symptoms have since resolved.  He is able to do moderate amount of exertion without any difficulty.  He has never noticed shortness of breath or chest pressure.  Echocardiogram shows normal biventricular systolic function.  He has severe LVH which is also reflected on the EKG which shows LVH with repolarization changes. EKG also shows 1 AVB.     Assessment and plan  1.  Preoperative cardiovascular risk ratification before elective left hip replacement  2.  Essential hypertension- LVH with repol on EKG, severe LVH on echo  3.  Hyperlipidemia-on Lipitor    I had a long discussion with the patient about his preoperative risk.  I told him that given there is nothing to suggest that he has obstructive coronary disease is low risk of cardiovascular complication in perioperative period is low.  However, it is never 0.  I would recommend a regular cardiac precautions.  In addition, I do not believe he needs to have any further ischemic work-up done prior to surgery.  We did discuss the need to keep his blood pressure very well controlled to prevent development of diastolic dysfunction in the future.  I will see him on as-needed basis going forward.    Thank you for allowing me to participate in the care of Jamel Jasso    This note was completed in part using  [employer] voice recognition software. Although reviewed after completion, some word and grammatical errors may occur.    Srinivasa Leyva MD  Cardiology    Orders Placed This Encounter   Procedures     EKG 12-lead complete w/read - Clinics (performed today)       No orders of the defined types were placed in this encounter.      There are no discontinued medications.    Encounter Diagnoses   Name Primary?     Coronary artery disease involving native coronary artery of native heart without angina pectoris      First degree AV block        CURRENT MEDICATIONS:  Current Outpatient Medications   Medication Sig Dispense Refill     amLODIPine (NORVASC) 5 MG tablet Take 2 tablets (10 mg) by mouth daily 30 tablet 0     aspirin (ASA) 81 MG EC tablet Take 1 tablet (81 mg) by mouth daily       atorvastatin (LIPITOR) 80 MG tablet Take 1 tablet (80 mg) by mouth daily 90 tablet 1     chlorthalidone (HYGROTON) 25 MG tablet Take 1 tablet (25 mg) by mouth daily 90 tablet 1     ferrous fumarate 65 mg, [place]. FE,-Vitamin C 125 mg (VITRON C)  MG TABS tablet Take 1 tablet by mouth daily       lisinopril (ZESTRIL) 40 MG tablet Take 1 tablet (40 mg) by mouth daily 90 tablet 1     metoprolol succinate ER (TOPROL XL) 50 MG 24 hr tablet Take 1 tablet (50 mg) by mouth daily 90 tablet 1     multivitamin w/minerals (THERA-VIT-M) tablet TAKE ONE TABLET BY MOUTH ONCE DAILY         ALLERGIES     Allergies   Allergen Reactions     Shellfish Allergy Anaphylaxis     Bicillin C-R,      Other reaction(s): *Unknown     Penicillins        PAST MEDICAL HISTORY:  Past Medical History:   Diagnosis Date     Coronary artery disease involving native coronary artery of native heart without angina pectoris 1/9/2023       PAST SURGICAL HISTORY:  No past surgical history on file.    FAMILY HISTORY:  Family History   Family history unknown: Yes       SOCIAL HISTORY:  Social History     Socioeconomic History     Marital status:      Spouse name: None      "Number of children: None     Years of education: None     Highest education level: None   Tobacco Use     Smoking status: Never     Smokeless tobacco: Never   Vaping Use     Vaping Use: Never used   Substance and Sexual Activity     Alcohol use: Yes     Comment: 1x week     Drug use: Never     Sexual activity: Yes     Social Determinants of Health     Financial Resource Strain: Low Risk      Difficulty of Paying Living Expenses: Not hard at all   Food Insecurity: No Food Insecurity     Worried About Running Out of Food in the Last Year: Never true     Ran Out of Food in the Last Year: Never true   Transportation Needs: No Transportation Needs     Lack of Transportation (Medical): No     Lack of Transportation (Non-Medical): No   Physical Activity: Insufficiently Active     Days of Exercise per Week: 2 days     Minutes of Exercise per Session: 20 min   Stress: No Stress Concern Present     Feeling of Stress : Not at all   Social Connections: Moderately Integrated     Frequency of Communication with Friends and Family: Twice a week     Frequency of Social Gatherings with Friends and Family: Once a week     Attends [employer] Services: More than 4 times per year     Active Member of Clubs or Organizations: No     Marital Status:    Housing Stability: Low Risk      Unable to Pay for Housing in the Last Year: No     Number of Places Lived in the Last Year: 1     Unstable Housing in the Last Year: No       Review of Systems:  Skin:  not assessed       Eyes:  not assessed      ENT:  not assessed      Respiratory:  Negative       Cardiovascular:  Negative      Gastroenterology: not assessed      Genitourinary:  not assessed      Musculoskeletal:  not assessed      Neurologic:  not assessed      Psychiatric:  not assessed      Heme/Lymph/Imm:  not assessed      Endocrine:  Negative        Physical Exam:  Vitals: /73 (Cuff Size: Adult Regular)   Pulse 68   Ht 1.88 m (6' 2\")   Wt 95.6 kg (210 lb 12.8 oz)   SpO2 " 99%   BMI 27.07 kg/m    Eyes: No icterus.  Pulmonary: Chest symmetric, lungs clear bilaterally and no crackles, wheezes or rales.  Cardiovascular: RRR with normal S1 and S2, no murmur, JVP normal.  Musculoskeletal: Edema of the lower extremities: None.  Neurologic: Oriented and appropriate without obvious focal deficits.   Psychiatric: Normal affect.     Recent Lab Results:  LIPID RESULTS:  Lab Results   Component Value Date    CHOL 171 06/08/2022    HDL 47 06/08/2022     (H) 06/08/2022    TRIG 73 06/08/2022       LIVER ENZYME RESULTS:  Lab Results   Component Value Date    AST 35 06/08/2022    AST 28 10/08/2020    ALT 36 06/08/2022    ALT 32 10/08/2020       CBC RESULTS:  Lab Results   Component Value Date    WBC 6.6 01/10/2023    WBC 5.2 10/08/2020    RBC 4.86 01/10/2023    RBC 4.61 10/08/2020    HGB 14.2 01/10/2023    HGB 13.7 10/08/2020    HCT 43.4 01/10/2023    HCT 41.7 10/08/2020    MCV 89 01/10/2023    MCV 91 10/08/2020    MCH 29.2 01/10/2023    MCH 29.7 10/08/2020    MCHC 32.7 01/10/2023    MCHC 32.9 10/08/2020    RDW 12.5 01/10/2023    RDW 12.0 10/08/2020     01/10/2023     10/08/2020       BMP RESULTS:  Lab Results   Component Value Date     01/10/2023     10/08/2020    POTASSIUM 4.0 01/10/2023    POTASSIUM 4.0 03/01/2022    POTASSIUM 3.4 10/08/2020    CHLORIDE 101 01/10/2023    CHLORIDE 107 03/01/2022    CHLORIDE 104 10/08/2020    CO2 24 01/10/2023    CO2 21 03/01/2022    CO2 24 10/08/2020    ANIONGAP 13 01/10/2023    ANIONGAP 8 03/01/2022    ANIONGAP 7 10/08/2020    GLC 94 01/10/2023    GLC 91 03/01/2022    GLC 88 10/08/2020    BUN 17.8 01/10/2023    BUN 18 03/01/2022    BUN 12 10/08/2020    CR 1.14 01/10/2023    CR 1.19 10/08/2020    GFRESTIMATED 77 01/10/2023    GFRESTIMATED 71 10/08/2020    GFRESTBLACK 82 10/08/2020    ELDA 10.0 01/10/2023    ELDA 8.7 10/08/2020        A1C RESULTS:  No results found for: A1C    INR RESULTS:  No results found for: INR    CC  Tesfaye  Luis Contreras PA-C  [address] [place] [address]    All medical records were reviewed in detail and discussed with the patient. Greater than 30 mins were spent with the patient, 50% of this time was spent on counseling and coordination of care.  After visit summary was printed and given to the patient.

## 2023-01-20 NOTE — TELEPHONE ENCOUNTER
Patient informed and said purchases these OTC.   Taking amlodipine and will request this refilled at preop next week.   Clem Hernandez RN - Patient Advocate and Liaison (PAL)  [facility]   [phone]

## 2023-01-20 NOTE — TELEPHONE ENCOUNTER
These are not on active medlist and certavite is not covered by insurance and otc. Please call patient and inform and inquire on refill request.     -gary rao, pac

## 2023-01-20 NOTE — TELEPHONE ENCOUNTER
Routing refill request to provider for review/approval because:  Drug not active on patient's medication list    Eileen Del Valle RN   PAL (Patient Advocate Liason)  [facility]

## 2023-01-23 RX ORDER — FOLIC ACID 1 MG/1
TABLET ORAL
Qty: 90 TABLET | Refills: 3 | OUTPATIENT
Start: 2023-01-23

## 2023-01-23 RX ORDER — FOLIC ACID/MV,IRON,MIN/LUTEIN 0.4-18-25
TABLET ORAL
Qty: 90 TABLET | Refills: 0 | OUTPATIENT
Start: 2023-01-23

## 2023-01-25 ENCOUNTER — OFFICE VISIT (OUTPATIENT)
Dept: FAMILY MEDICINE | Facility: CLINIC | Age: 53
End: 2023-01-25
Payer: COMMERCIAL

## 2023-01-25 VITALS
DIASTOLIC BLOOD PRESSURE: 78 MMHG | TEMPERATURE: 98 F | RESPIRATION RATE: 18 BRPM | BODY MASS INDEX: 28.04 KG/M2 | SYSTOLIC BLOOD PRESSURE: 138 MMHG | HEIGHT: 72 IN | HEART RATE: 69 BPM | OXYGEN SATURATION: 98 % | WEIGHT: 207 LBS

## 2023-01-25 DIAGNOSIS — I10 ESSENTIAL HYPERTENSION: ICD-10-CM

## 2023-01-25 DIAGNOSIS — Z12.11 SCREEN FOR COLON CANCER: ICD-10-CM

## 2023-01-25 DIAGNOSIS — M16.12 ARTHRITIS OF LEFT HIP: ICD-10-CM

## 2023-01-25 DIAGNOSIS — R79.89 ELEVATED SERUM CREATININE: ICD-10-CM

## 2023-01-25 DIAGNOSIS — Z01.818 PREOP GENERAL PHYSICAL EXAM: Primary | ICD-10-CM

## 2023-01-25 DIAGNOSIS — R79.89 LOW SERUM SODIUM: ICD-10-CM

## 2023-01-25 LAB
ANION GAP SERPL CALCULATED.3IONS-SCNC: 13 MMOL/L (ref 7–15)
BUN SERPL-MCNC: 28.9 MG/DL (ref 6–20)
CALCIUM SERPL-MCNC: 9.3 MG/DL (ref 8.6–10)
CHLORIDE SERPL-SCNC: 101 MMOL/L (ref 98–107)
CREAT SERPL-MCNC: 1.3 MG/DL (ref 0.67–1.17)
DEPRECATED HCO3 PLAS-SCNC: 22 MMOL/L (ref 22–29)
ERYTHROCYTE [DISTWIDTH] IN BLOOD BY AUTOMATED COUNT: 12.2 % (ref 10–15)
GFR SERPL CREATININE-BSD FRML MDRD: 66 ML/MIN/1.73M2
GLUCOSE SERPL-MCNC: 120 MG/DL (ref 70–99)
HCT VFR BLD AUTO: 38.5 % (ref 40–53)
HGB BLD-MCNC: 12.8 G/DL (ref 13.3–17.7)
MCH RBC QN AUTO: 29.5 PG (ref 26.5–33)
MCHC RBC AUTO-ENTMCNC: 33.2 G/DL (ref 31.5–36.5)
MCV RBC AUTO: 89 FL (ref 78–100)
PLATELET # BLD AUTO: 271 10E3/UL (ref 150–450)
POTASSIUM SERPL-SCNC: 3.9 MMOL/L (ref 3.4–5.3)
RBC # BLD AUTO: 4.34 10E6/UL (ref 4.4–5.9)
SODIUM SERPL-SCNC: 136 MMOL/L (ref 136–145)
WBC # BLD AUTO: 4.1 10E3/UL (ref 4–11)

## 2023-01-25 PROCEDURE — 99214 OFFICE O/P EST MOD 30 MIN: CPT | Performed by: PHYSICIAN ASSISTANT

## 2023-01-25 PROCEDURE — 85027 COMPLETE CBC AUTOMATED: CPT | Performed by: PHYSICIAN ASSISTANT

## 2023-01-25 PROCEDURE — 80048 BASIC METABOLIC PNL TOTAL CA: CPT | Performed by: PHYSICIAN ASSISTANT

## 2023-01-25 PROCEDURE — 36415 COLL VENOUS BLD VENIPUNCTURE: CPT | Performed by: PHYSICIAN ASSISTANT

## 2023-01-25 RX ORDER — AMLODIPINE BESYLATE 10 MG/1
10 TABLET ORAL DAILY
Qty: 90 TABLET | Refills: 1 | Status: SHIPPED | OUTPATIENT
Start: 2023-01-25 | End: 2023-07-06

## 2023-01-25 NOTE — PROGRESS NOTES
[address] [address]  Phone: [phone]  Primary Provider: Phillip Brush  Pre-op Performing Provider: PHILLIP BRUSH      PREOPERATIVE EVALUATION:  Today's date: 1/25/2023    Jamel Jasso is a 52 year old male who presents for a preoperative evaluation.    Surgical Information:  Surgery/Procedure: Hip-left  Surgery Location: [facility]  Surgeon: radhika Ring  Surgery Date: 2/21/23  Time of Surgery: 7:00a  Where patient plans to recover: At home with family  Fax number for surgical facility: [phone]    Type of Anesthesia Anticipated: General    Assessment & Plan     The proposed surgical procedure is considered INTERMEDIATE risk.    Preop general physical exam  Stable exam. bp now controlled.     Essential hypertension  As above. Will continue on current regimen. Of note, in past restarted current regimen and noted side effects and norvasc was stopped in nov of 2021. Currently no side effects. Will maintain current regimen.   - amLODIPine (NORVASC) 10 MG tablet; Take 1 tablet (10 mg) by mouth daily  -Medication use and side effects discussed with the patient. Patient is in complete understanding and agreement with plan.       Arthritis of left hip      Screen for colon cancer    Reported [facility] in [phone]. Report not present.              (R79.67) Elevated serum creatinine  Comment: elevated on check today. Possible mild dehydration at time of testing. Normal 2 weeks prior. However, may be related to restarting current medication regimen. Will have recheck this early next week at lab only while hydrated. If continuing/ worsening, will need to consider reducing or stopping chlorthalidone. In regards to his upcoming surgery, not in a concerning range. Cleared.   Plan:       Risks and Recommendations:  The patient has the following additional risks and recommendations for perioperative complications:   - No identified  additional risk factors other than previously addressed    Medication Instructions:  Patient is to take all scheduled medications on the day of surgery EXCEPT for modifications listed below:   - ACE/ARB: HOLD due to exceptional risk of hypotension during surgery.    - Beta Blockers: Continue taking on the day of surgery.   - Diuretics: HOLD on the day of surgery.   - Statins: Continue taking on the day of surgery.     RECOMMENDATION:  APPROVAL GIVEN to proceed with proposed procedure, without further diagnostic evaluation.    Subjective     HPI related to upcoming procedure: history of left hip arthritis. The above procedure was deemed the next best step.     Preop Questions 1/24/2023   1. Have you ever had a heart attack or stroke? No   2. Have you ever had surgery on your heart or blood vessels, such as a stent placement, a coronary artery bypass, or surgery on an artery in your head, neck, heart, or legs? No   3. Do you have chest pain with activity? No   4. Do you have a history of  heart failure? No   5. Do you currently have a cold, bronchitis or symptoms of other infection? No   6. Do you have a cough, shortness of breath, or wheezing? No   7. Do you or anyone in your family have previous history of blood clots? No   8. Do you or does anyone in your family have a serious bleeding problem such as prolonged bleeding following surgeries or cuts? No   9. Have you ever had problems with anemia or been told to take iron pills? No   10. Have you had any abnormal blood loss such as black, tarry or bloody stools? No   11. Have you ever had a blood transfusion? No   12. Are you willing to have a blood transfusion if it is medically needed before, during, or after your surgery? Yes   13. Have you or any of your relatives ever had problems with anesthesia? No   14. Do you have sleep apnea, excessive snoring or daytime drowsiness? No   15. Do you have any artifical heart valves or other implanted medical devices like a  pacemaker, defibrillator, or continuous glucose monitor? No   16. Do you have artificial joints? YES -    17. Are you allergic to latex? No       Health Care Directive:  Patient does not have a Health Care Directive or Living Will: Discussed advance care planning with patient; information given to patient to review.    Preoperative Review of :   reviewed - no record of controlled substances prescribed. no recent controlled substances      Status of Chronic Conditions:  See problem list for active medical problems.  Problems all longstanding and stable, except as noted/documented.  See ROS for pertinent symptoms related to these conditions.    HYPERTENSION - Patient has longstanding history of HTN , currently denies any symptoms referable to elevated blood pressure. Specifically denies chest pain, palpitations, dyspnea, orthopnea, PND or peripheral edema. Blood pressure readings have recently been in normal range. Current medication regimen is as listed below. Patient denies any side effects of medication.     CAD history. Questionable nstemi in past. Stress test in 2021 showed no ischemia. Recent cardiology consult did not feel any risk ischemic events. Does have past LVH. Past echo in 2020. Echo stress in 2021. ekg 1/9/23.     Review of Systems: hip pain. Other than above,   CONSTITUTIONAL: NEGATIVE for fever, chills, change in weight  INTEGUMENTARY/SKIN: NEGATIVE for worrisome rashes, moles or lesions  EYES: NEGATIVE for vision changes or irritation  ENT/MOUTH: NEGATIVE for ear, mouth and throat problems  RESP: NEGATIVE for significant cough or SOB  CV: NEGATIVE for chest pain, palpitations or peripheral edema  GI: NEGATIVE for nausea, abdominal pain, heartburn, or change in bowel habits  : NEGATIVE for frequency, dysuria, or hematuria  MUSCULOSKELETAL: NEGATIVE for significant arthralgias or myalgia  NEURO: NEGATIVE for weakness, dizziness or paresthesias  ENDOCRINE: NEGATIVE for temperature intolerance,  skin/hair changes  HEME: NEGATIVE for bleeding problems  PSYCHIATRIC: NEGATIVE for changes in mood or affect    Patient Active Problem List    Diagnosis Date Noted     Coronary artery disease involving native coronary artery of native heart without angina pectoris 01/09/2023     Priority: Medium     Mixed hyperlipidemia 01/09/2023     Priority: Medium     First degree AV block 01/09/2023     Priority: Medium     LVH (left ventricular hypertrophy) 09/23/2021     Priority: Medium     Essential hypertension 09/23/2021     Priority: Medium     Adenomatous polyp of colon 10/20/2020     Priority: Medium     Hypertensive emergency 10/08/2020     Priority: Medium     Hypertensive urgency 10/08/2020     Priority: Medium      Past Medical History:   Diagnosis Date     Coronary artery disease involving native coronary artery of native heart without angina pectoris 01/09/2023     Hypertension 03/2022    Im not sure, need to check history     Past Surgical History:   Procedure Laterality Date     ORTHOPEDIC SURGERY       Current Outpatient Medications   Medication Sig Dispense Refill     amLODIPine (NORVASC) 10 MG tablet Take 1 tablet (10 mg) by mouth daily 90 tablet 1     ASPIRIN NOT PRESCRIBED (INTENTIONAL) Please choose reason not prescribed from choices below.       atorvastatin (LIPITOR) 80 MG tablet Take 1 tablet (80 mg) by mouth daily 90 tablet 1     chlorthalidone (HYGROTON) 25 MG tablet Take 1 tablet (25 mg) by mouth daily 90 tablet 1     ferrous fumarate 65 mg, [employer]. FE,-Vitamin C 125 mg (VITRON C)  MG TABS tablet Take 1 tablet by mouth daily       lisinopril (ZESTRIL) 40 MG tablet Take 1 tablet (40 mg) by mouth daily 90 tablet 1     metoprolol succinate ER (TOPROL XL) 50 MG 24 hr tablet Take 1 tablet (50 mg) by mouth daily 90 tablet 1     multivitamin w/minerals (THERA-VIT-M) tablet TAKE ONE TABLET BY MOUTH ONCE DAILY         Allergies   Allergen Reactions     Shellfish Allergy Anaphylaxis     Bicillin C-R,       "Other reaction(s): *Unknown     Penicillins         Social History     Tobacco Use     Smoking status: Never     Smokeless tobacco: Never   Substance Use Topics     Alcohol use: Not Currently     Comment: 1x week     Family History   Family history unknown: Yes     History   Drug Use Unknown         Objective     /78 (BP Location: Right arm, Patient Position: Chair, Cuff Size: Adult Large)   Pulse 69   Temp 98  F (36.7  C) (Oral)   Resp 18   Ht 1.816 m (5' 11.5\")   Wt 93.9 kg (207 lb)   SpO2 98%   BMI 28.47 kg/m      Physical Exam    GENERAL APPEARANCE: healthy, alert and no distress     EYES: EOMI,  PERRL     HENT: ear canals and TM's normal and nose and mouth without ulcers or lesions     NECK: no adenopathy, no asymmetry, masses, or scars and thyroid normal to palpation     RESP: lungs clear to auscultation - no rales, rhonchi or wheezes     CV: regular rates and rhythm, normal S1 S2, no S3 or S4 and no murmur, click or rub     ABDOMEN:  soft, nontender, no HSM or masses and bowel sounds normal     MS: extremities normal- no gross deformities noted, no evidence of inflammation in joints, FROM in all extremities.     SKIN: no suspicious lesions or rashes     NEURO: Normal strength and tone, sensory exam grossly normal, mentation intact and speech normal     PSYCH: mentation appears normal. and affect normal/bright     LYMPHATICS: No cervical adenopathy    Recent Labs   Lab Test 01/10/23  2153 01/09/23  0841   HGB 14.2 13.0*    220    135*   POTASSIUM 4.0 3.7   CR 1.14 0.92        Diagnostics:  Labs pending at this time.  Results will be reviewed when available.  Recent Results (from the past 24 hour(s))   Basic metabolic panel  (Ca, Cl, CO2, Creat, Gluc, K, Na, BUN)    Collection Time: 01/25/23  8:11 AM   Result Value Ref Range    Sodium 136 136 - 145 mmol/L    Potassium 3.9 3.4 - 5.3 mmol/L    Chloride 101 98 - 107 mmol/L    Carbon Dioxide (CO2) 22 22 - 29 mmol/L    Anion Gap 13 7 - 15 " mmol/L    Urea Nitrogen 28.9 (H) 6.0 - 20.0 mg/dL    Creatinine 1.30 (H) 0.67 - 1.17 mg/dL    Calcium 9.3 8.6 - 10.0 mg/dL    Glucose 120 (H) 70 - 99 mg/dL    GFR Estimate 66 >60 mL/min/1.73m2   CBC with platelets    Collection Time: 23  8:11 AM   Result Value Ref Range    WBC Count 4.1 4.0 - 11.0 10e3/uL    RBC Count 4.34 (L) 4.40 - 5.90 10e6/uL    Hemoglobin 12.8 (L) 13.3 - 17.7 g/dL    Hematocrit 38.5 (L) 40.0 - 53.0 %    MCV 89 78 - 100 fL    MCH 29.5 26.5 - 33.0 pg    MCHC 33.2 31.5 - 36.5 g/dL    RDW 12.2 10.0 - 15.0 %    Platelet Count 271 150 - 450 10e3/uL      Ek23: sinus bradycardia, LVH pressent. first degree block. since previous, first degree block is returned. was present on 10/8/2020 ekg    Revised Cardiac Risk Index (RCRI):  The patient has the following serious cardiovascular risks for perioperative complications:   - Coronary Artery Disease (MI, positive stress test, angina, Qs on EKG) = 1 point     RCRI Interpretation: 1 point: Class II (low risk - 0.9% complication rate)           Signed Electronically by: Tesfaye Contreras PA-C  Copy of this evaluation report is provided to requesting physician.

## 2023-01-25 NOTE — PATIENT INSTRUCTIONS
Hold lisinopril  and chlorthalidone the day of surgery. All others meds can be taken as prescribed.       For informational purposes only. Not to replace the advice of your health care provider. Copyright   ,  [employer]. All rights reserved. Clinically reviewed by Angi Malagon MD. [employer] [id number] - REV .  Preparing for Your Surgery  Getting started  A nurse will call you to review your health history and instructions. They will give you an arrival time based on your scheduled surgery time. Please be ready to share:    Your doctor's clinic name and phone number    Your medical, surgical, and anesthesia history    A list of allergies and sensitivities    A list of medicines, including herbal treatments and over-the-counter drugs    Whether the patient has a legal guardian (ask how to send us the papers in advance)  Please tell us if you're pregnant--or if there's any chance you might be pregnant. Some surgeries may injure a fetus (unborn baby), so they require a pregnancy test. Surgeries that are safe for a fetus don't always need a test, and you can choose whether to have one.   If you have a child who's having surgery, please ask for a copy of Preparing for Your Child's Surgery.    Preparing for surgery    Within 10 to 30 days of surgery: Have a pre-op exam (sometimes called an H&P, or History and Physical). This can be done at a clinic or pre-operative center.  ? If you're having a , you may not need this exam. Talk to your care team.    At your pre-op exam, talk to your care team about all medicines you take. If you need to stop any medicines before surgery, ask when to start taking them again.  ? We do this for your safety. Many medicines can make you bleed too much during surgery. Some change how well surgery (anesthesia) drugs work.    Call your insurance company to let them know you're having surgery. (If you don't have insurance, call [phone].)    Call your  clinic if there's any change in your health. This includes signs of a cold or flu (sore throat, runny nose, cough, rash, fever). It also includes a scrape or scratch near the surgery site.    If you have questions on the day of surgery, call your hospital or surgery center.  Eating and drinking guidelines  For your safety: Unless your surgeon tells you otherwise, follow the guidelines below.    Eat and drink as usual until 8 hours before you arrive for surgery. After that, no food or milk.    Drink clear liquids until 2 hours before you arrive. These are liquids you can see through, like water, Gatorade, and Propel Water. They also include plain black coffee and tea (no cream or milk), candy, and breath mints. You can spit out gum when you arrive.    If you drink alcohol: Stop drinking it the night before surgery.    If your care team tells you to take medicine on the morning of surgery, it's okay to take it with a sip of water.  Preventing infection    Shower or bathe the night before and morning of your surgery. Follow the instructions your clinic gave you. (If no instructions, use regular soap.)    Don't shave or clip hair near your surgery site. We'll remove the hair if needed.    Don't smoke or vape the morning of surgery. You may chew nicotine gum up to 2 hours before surgery. A nicotine patch is okay.  ? Note: Some surgeries require you to completely quit smoking and nicotine. Check with your surgeon.    Your care team will make every effort to keep you safe from infection. We will:  ? Clean our hands often with soap and water (or an alcohol-based hand rub).  ? Clean the skin at your surgery site with a special soap that kills germs.  ? Give you a special gown to keep you warm. (Cold raises the risk of infection.)  ? Wear special hair covers, masks, gowns and gloves during surgery.  ? Give antibiotic medicine, if prescribed. Not all surgeries need antibiotics.  What to bring on the day of surgery    Photo ID  and insurance card    Copy of your health care directive, if you have one    Glasses and hearing aids (bring cases)  ? You can't wear contacts during surgery    Inhaler and eye drops, if you use them (tell us about these when you arrive)    CPAP machine or breathing device, if you use them    A few personal items, if spending the night    If you have . . .  ? A pacemaker, ICD (cardiac defibrillator) or other implant: Bring the ID card.  ? An implanted stimulator: Bring the remote control.  ? A legal guardian: Bring a copy of the certified (court-stamped) guardianship papers.  Please remove any jewelry, including body piercings. Leave jewelry and other valuables at home.  If you're going home the day of surgery    You must have a responsible adult drive you home. They should stay with you overnight as well.    If you don't have someone to stay with you, and you aren't safe to go home alone, we may keep you overnight. Insurance often won't pay for this.  After surgery  If it's hard to control your pain or you need more pain medicine, please call your surgeon's office.  Questions?   If you have any questions for your care team, list them here: _________________________________________________________________________________________________________________________________________________________________________ ____________________________________ ____________________________________ ____________________________________

## 2023-01-26 ENCOUNTER — TELEPHONE (OUTPATIENT)
Dept: FAMILY MEDICINE | Facility: CLINIC | Age: 53
End: 2023-01-26
Payer: COMMERCIAL

## 2023-01-26 NOTE — TELEPHONE ENCOUNTER
Left message to call back. 1/25/23 lab results note:     Your kidney function is a bit high. This may have been due to mild dehydration but with restarting your blood pressure medications, we should recheck this early next week. Can be lab only appt at your work with plenty of hydration.    Your cbc. Mild anemia, but stable and not a concern. We can monitor this into the future and make sure this normalizes. No concerns for surgery.    Bora rao, babak Hernandez RN - Patient Advocate and Liaison (PAL)  [facility]   [phone]

## 2023-01-26 NOTE — TELEPHONE ENCOUNTER
Called pt, informed, agrees, will call back if not able to get follow up lab at [facility] lab  Cathy Paiz RN, BSN  [facility]

## 2023-02-09 ENCOUNTER — TELEPHONE (OUTPATIENT)
Dept: FAMILY MEDICINE | Facility: CLINIC | Age: 53
End: 2023-02-09
Payer: COMMERCIAL

## 2023-02-09 NOTE — TELEPHONE ENCOUNTER
Patient informed. Emphasized importance of hydration. Lab only 2/10/23.   Clem Hernandez RN - Patient Advocate and Liaison (PAL)  [facility]   [phone]

## 2023-02-09 NOTE — TELEPHONE ENCOUNTER
Patient left message stating returning PAL call. (see 1/26/23 note)     Left message to call back. Creatinine lab due at earliest opportunity. Offer lab only.  Clem Hernandez RN - Patient Advocate and Liaison (PAL)  [facility]   [phone]

## 2023-02-14 ENCOUNTER — LAB (OUTPATIENT)
Dept: LAB | Facility: CLINIC | Age: 53
End: 2023-02-14
Payer: COMMERCIAL

## 2023-02-14 DIAGNOSIS — R79.89 ELEVATED SERUM CREATININE: ICD-10-CM

## 2023-02-14 LAB
CREAT SERPL-MCNC: 1.17 MG/DL (ref 0.67–1.17)
GFR SERPL CREATININE-BSD FRML MDRD: 75 ML/MIN/1.73M2

## 2023-02-14 PROCEDURE — 36415 COLL VENOUS BLD VENIPUNCTURE: CPT

## 2023-02-14 PROCEDURE — 82565 ASSAY OF CREATININE: CPT

## 2023-02-21 ENCOUNTER — TRANSFERRED RECORDS (OUTPATIENT)
Dept: HEALTH INFORMATION MANAGEMENT | Facility: CLINIC | Age: 53
End: 2023-02-21
Payer: COMMERCIAL

## 2023-03-09 ENCOUNTER — TRANSFERRED RECORDS (OUTPATIENT)
Dept: HEALTH INFORMATION MANAGEMENT | Facility: CLINIC | Age: 53
End: 2023-03-09

## 2023-04-09 ENCOUNTER — HEALTH MAINTENANCE LETTER (OUTPATIENT)
Age: 53
End: 2023-04-09

## 2023-04-17 ENCOUNTER — PATIENT OUTREACH (OUTPATIENT)
Dept: FAMILY MEDICINE | Facility: CLINIC | Age: 53
End: 2023-04-17
Payer: COMMERCIAL

## 2023-04-17 NOTE — LETTER
Arturo Mccullough,     Thank you for choosing [facility] for your health care needs.     [facility] is transforming primary care  At [facility], we re dedicated to constantly improve how we serve the health care needs of our patients and communities. We re currently making changes to the way we deliver care.     Changes you ll notice include:  An emphasis on building a relationship with a primary care provider  Access to a PAL (patient advocate and liaison) to help guide you with your care needs  Appointment lengths tailored to your specific needs and greater access to a care team to help you and your provider improve and maintain your health and well-being  Improved online access to your care team    Benefits of a primary care provider  If you don t have a designated primary care provider, we encourage you to get to know our care team online and find a provider you d like to see. Most of our providers have a short video on their online provider page. Visit [email or web address] to explore our providers and locations.    Benefits of having a primary care provider include:    They get to know you - your health history, family history and goals, making it easier to make a health plan together.   You get to know them - making health-related conversations and decisions easier    Primary care doctors help you when you re sick or hurt - but also focus on keeping you healthy with preventive care and screenings.    A doctor who sees you regularly is more likely to notice changes in your health.   You ll be connected to a broad care team who partners with your provider to support you.    Patient Advocate Liaison (PAL)   To help make sure you get the right care, at the right time, we include PALs, or Patient Advocate Liaisons, as part of your care team. Your PAL will be your first line of contact. They ll advocate for your needs and help you navigate our services, connecting you with care team members and community  resources to ensure your care is well coordinated. You may be introduced to a PAL in an upcoming visit.     Expanded care team access with tailored appointment lengths  Depending on your health care needs, you may have longer or shorter appointments and see additional care team providers - including Medication Therapy Management (MTM) pharmacists, diabetes educators, behavioral health clinicians, or social workers. At times, they may be included in your visit with your provider, or you may see them individually.     Online access to your health care records and care team  [employer] is our online tool that makes it easy to see your health care information and communicate with your care team.     [employer] allows you to:   View your health maintenance plan so you know when you re due for a preventive screening  Send secure messages to your care team  View your health history and visit summaries   Schedule appointments   Complete questionnaires and eCheck-in before appointments    Get care from your provider with an e-visit    View and pay your bill     Sign up at [employer]. Once you have an account, you also can download the mobile tim.     Connecting to fast and convenient care  When you need fast, convenient care - consider one of the following options:     Video Visit: A convenient care option for visiting with your provider out of the comfort of your own home. Most of the things you come to the clinic to address with your provider can now be done virtually through a video. This includes your chronic medication follow up, questions or concerns you may have, and even your annual Medicare Wellness Visit.     Phone Visit: Another convenient option for follow up of common problems that may require a more in-depth discussion with your provider.     E-visit: When you need acute care quickly, or have a quick question about your medication, an E-visit is completed through [employer] and your provider will respond  within one business day.      Take care,   Eileen SYED RN   PAL (Patient Advocate Liaison)  [facility]  [phone]

## 2023-04-17 NOTE — TELEPHONE ENCOUNTER
SB3/5 PAL welcome letter sent     LUL Hansen (Patient Advocate Liaison)  [facility]  [phone]

## 2023-04-27 ENCOUNTER — PATIENT OUTREACH (OUTPATIENT)
Dept: FAMILY MEDICINE | Facility: CLINIC | Age: 53
End: 2023-04-27
Payer: COMMERCIAL

## 2023-04-27 NOTE — TELEPHONE ENCOUNTER
Patient Quality Outreach Health Maintenance - PAL RN    Summary:    PAL RN contacted pt regarding overdue health maintenance    Patient is due/failing the following:   Colon Cancer Screening  Physical Preventive Adult Physical      Topic Date Due     Pneumococcal Vaccine (1 - PCV) Never done     Zoster (Shingles) Vaccine (1 of 2) Never done     Hepatitis B Vaccine (2 of 3 - 19+ 3-dose series) 07/23/2020       Health Maintenance Due   Topic Date Due     Pneumococcal Vaccine: Pediatrics (0 to 5 Years) and At-Risk Patients (6 to 64 Years) (1 - PCV) Never done     COLORECTAL CANCER SCREENING  Never done     ZOSTER IMMUNIZATION (1 of 2) Never done     HEPATITIS B IMMUNIZATION (2 of 3 - 19+ 3-dose series) 07/23/2020     YEARLY PREVENTIVE VISIT  09/23/2022       Type of outreach:    Phone, spoke to patient/parent. Spoke to pt.     - Advised patient if any questions or concerns comes up to call the PAL RN.   - Patient given opportunity to ask questions and at this time there is nothing further needed.     Assisted pt with scheduling Annual preventative visit with PCP on 7/6/23 at 8:10 AM.     Pt verbalizes understanding and is agreeable with appointment time and date.      Questions for provider review:    None                                                                                     Chart routed to WEND[facility].    LUL Hansen (Patient Advocate Liaison)  [facility]  [phone]

## 2023-07-05 ENCOUNTER — TRANSFERRED RECORDS (OUTPATIENT)
Dept: HEALTH INFORMATION MANAGEMENT | Facility: CLINIC | Age: 53
End: 2023-07-05
Payer: COMMERCIAL

## 2023-07-05 SDOH — ECONOMIC STABILITY: INCOME INSECURITY: IN THE LAST 12 MONTHS, WAS THERE A TIME WHEN YOU WERE NOT ABLE TO PAY THE MORTGAGE OR RENT ON TIME?: PATIENT REFUSED

## 2023-07-05 SDOH — HEALTH STABILITY: PHYSICAL HEALTH: ON AVERAGE, HOW MANY MINUTES DO YOU ENGAGE IN EXERCISE AT THIS LEVEL?: 60 MIN

## 2023-07-05 SDOH — ECONOMIC STABILITY: FOOD INSECURITY: WITHIN THE PAST 12 MONTHS, THE FOOD YOU BOUGHT JUST DIDN'T LAST AND YOU DIDN'T HAVE MONEY TO GET MORE.: PATIENT DECLINED

## 2023-07-05 SDOH — ECONOMIC STABILITY: TRANSPORTATION INSECURITY
IN THE PAST 12 MONTHS, HAS THE LACK OF TRANSPORTATION KEPT YOU FROM MEDICAL APPOINTMENTS OR FROM GETTING MEDICATIONS?: PATIENT DECLINED

## 2023-07-05 SDOH — ECONOMIC STABILITY: FOOD INSECURITY: WITHIN THE PAST 12 MONTHS, YOU WORRIED THAT YOUR FOOD WOULD RUN OUT BEFORE YOU GOT MONEY TO BUY MORE.: PATIENT DECLINED

## 2023-07-05 SDOH — HEALTH STABILITY: PHYSICAL HEALTH: ON AVERAGE, HOW MANY DAYS PER WEEK DO YOU ENGAGE IN MODERATE TO STRENUOUS EXERCISE (LIKE A BRISK WALK)?: 7 DAYS

## 2023-07-05 SDOH — ECONOMIC STABILITY: INCOME INSECURITY: HOW HARD IS IT FOR YOU TO PAY FOR THE VERY BASICS LIKE FOOD, HOUSING, MEDICAL CARE, AND HEATING?: NOT HARD AT ALL

## 2023-07-05 SDOH — ECONOMIC STABILITY: TRANSPORTATION INSECURITY
IN THE PAST 12 MONTHS, HAS LACK OF TRANSPORTATION KEPT YOU FROM MEETINGS, WORK, OR FROM GETTING THINGS NEEDED FOR DAILY LIVING?: PATIENT DECLINED

## 2023-07-05 ASSESSMENT — ENCOUNTER SYMPTOMS
PARESTHESIAS: 0
SHORTNESS OF BREATH: 0
EYE PAIN: 0
HEADACHES: 0
PALPITATIONS: 0
COUGH: 0
CHILLS: 0
CONSTIPATION: 0
HEMATURIA: 0
DIARRHEA: 0
FREQUENCY: 0
WEAKNESS: 0
HEARTBURN: 0
FEVER: 0
HEMATOCHEZIA: 0
ABDOMINAL PAIN: 0
NAUSEA: 0
DIZZINESS: 0
ARTHRALGIAS: 0
JOINT SWELLING: 0
MYALGIAS: 0
SORE THROAT: 0
NERVOUS/ANXIOUS: 0
DYSURIA: 0

## 2023-07-05 ASSESSMENT — SOCIAL DETERMINANTS OF HEALTH (SDOH)
IN A TYPICAL WEEK, HOW MANY TIMES DO YOU TALK ON THE PHONE WITH FAMILY, FRIENDS, OR NEIGHBORS?: PATIENT DECLINED
HOW OFTEN DO YOU ATTEND CHURCH OR RELIGIOUS SERVICES?: PATIENT DECLINED
HOW OFTEN DO YOU GET TOGETHER WITH FRIENDS OR RELATIVES?: PATIENT DECLINED
DO YOU BELONG TO ANY CLUBS OR ORGANIZATIONS SUCH AS CHURCH GROUPS UNIONS, FRATERNAL OR ATHLETIC GROUPS, OR SCHOOL GROUPS?: PATIENT DECLINED

## 2023-07-05 ASSESSMENT — LIFESTYLE VARIABLES
HOW OFTEN DO YOU HAVE A DRINK CONTAINING ALCOHOL: PATIENT DECLINED
HOW MANY STANDARD DRINKS CONTAINING ALCOHOL DO YOU HAVE ON A TYPICAL DAY: PATIENT DECLINED
SKIP TO QUESTIONS 9-10: 0
HOW OFTEN DO YOU HAVE SIX OR MORE DRINKS ON ONE OCCASION: PATIENT DECLINED
AUDIT-C TOTAL SCORE: -1

## 2023-07-06 ENCOUNTER — OFFICE VISIT (OUTPATIENT)
Dept: FAMILY MEDICINE | Facility: CLINIC | Age: 53
End: 2023-07-06
Payer: COMMERCIAL

## 2023-07-06 VITALS
HEIGHT: 74 IN | OXYGEN SATURATION: 98 % | BODY MASS INDEX: 26.18 KG/M2 | SYSTOLIC BLOOD PRESSURE: 130 MMHG | TEMPERATURE: 98.2 F | WEIGHT: 204 LBS | HEART RATE: 66 BPM | RESPIRATION RATE: 15 BRPM | DIASTOLIC BLOOD PRESSURE: 80 MMHG

## 2023-07-06 DIAGNOSIS — Z12.11 SCREEN FOR COLON CANCER: ICD-10-CM

## 2023-07-06 DIAGNOSIS — Z00.00 ROUTINE GENERAL MEDICAL EXAMINATION AT A HEALTH CARE FACILITY: Primary | ICD-10-CM

## 2023-07-06 DIAGNOSIS — I10 ESSENTIAL HYPERTENSION: ICD-10-CM

## 2023-07-06 DIAGNOSIS — E78.2 MIXED HYPERLIPIDEMIA: ICD-10-CM

## 2023-07-06 DIAGNOSIS — I25.10 CORONARY ARTERY DISEASE INVOLVING NATIVE CORONARY ARTERY OF NATIVE HEART WITHOUT ANGINA PECTORIS: ICD-10-CM

## 2023-07-06 DIAGNOSIS — Z12.5 SCREENING FOR PROSTATE CANCER: ICD-10-CM

## 2023-07-06 DIAGNOSIS — R73.09 ELEVATED GLUCOSE: ICD-10-CM

## 2023-07-06 LAB
ALBUMIN SERPL BCG-MCNC: 4.4 G/DL (ref 3.5–5.2)
ALP SERPL-CCNC: 95 U/L (ref 40–129)
ALT SERPL W P-5'-P-CCNC: 37 U/L (ref 0–70)
ANION GAP SERPL CALCULATED.3IONS-SCNC: 11 MMOL/L (ref 7–15)
AST SERPL W P-5'-P-CCNC: 39 U/L (ref 0–45)
BILIRUB SERPL-MCNC: 0.2 MG/DL
BUN SERPL-MCNC: 22.8 MG/DL (ref 6–20)
CALCIUM SERPL-MCNC: 10.1 MG/DL (ref 8.6–10)
CHLORIDE SERPL-SCNC: 104 MMOL/L (ref 98–107)
CHOLEST SERPL-MCNC: 155 MG/DL
CREAT SERPL-MCNC: 1.02 MG/DL (ref 0.67–1.17)
DEPRECATED HCO3 PLAS-SCNC: 22 MMOL/L (ref 22–29)
GFR SERPL CREATININE-BSD FRML MDRD: 88 ML/MIN/1.73M2
GLUCOSE SERPL-MCNC: 119 MG/DL (ref 70–99)
HDLC SERPL-MCNC: 34 MG/DL
LDLC SERPL CALC-MCNC: 91 MG/DL
NONHDLC SERPL-MCNC: 121 MG/DL
POTASSIUM SERPL-SCNC: 4.3 MMOL/L (ref 3.4–5.3)
PROT SERPL-MCNC: 7.8 G/DL (ref 6.4–8.3)
PSA SERPL DL<=0.01 NG/ML-MCNC: 0.52 NG/ML (ref 0–3.5)
SODIUM SERPL-SCNC: 137 MMOL/L (ref 136–145)
TRIGL SERPL-MCNC: 148 MG/DL

## 2023-07-06 PROCEDURE — 90677 PCV20 VACCINE IM: CPT | Performed by: PHYSICIAN ASSISTANT

## 2023-07-06 PROCEDURE — 99396 PREV VISIT EST AGE 40-64: CPT | Mod: 25 | Performed by: PHYSICIAN ASSISTANT

## 2023-07-06 PROCEDURE — 36415 COLL VENOUS BLD VENIPUNCTURE: CPT | Performed by: PHYSICIAN ASSISTANT

## 2023-07-06 PROCEDURE — 90472 IMMUNIZATION ADMIN EACH ADD: CPT | Performed by: PHYSICIAN ASSISTANT

## 2023-07-06 PROCEDURE — 90471 IMMUNIZATION ADMIN: CPT | Performed by: PHYSICIAN ASSISTANT

## 2023-07-06 PROCEDURE — 80061 LIPID PANEL: CPT | Performed by: PHYSICIAN ASSISTANT

## 2023-07-06 PROCEDURE — 90746 HEPB VACCINE 3 DOSE ADULT IM: CPT | Performed by: PHYSICIAN ASSISTANT

## 2023-07-06 PROCEDURE — 99214 OFFICE O/P EST MOD 30 MIN: CPT | Mod: 25 | Performed by: PHYSICIAN ASSISTANT

## 2023-07-06 PROCEDURE — G0103 PSA SCREENING: HCPCS | Performed by: PHYSICIAN ASSISTANT

## 2023-07-06 PROCEDURE — 80053 COMPREHEN METABOLIC PANEL: CPT | Performed by: PHYSICIAN ASSISTANT

## 2023-07-06 RX ORDER — CHLORTHALIDONE 25 MG/1
25 TABLET ORAL DAILY
Qty: 90 TABLET | Refills: 1 | Status: SHIPPED | OUTPATIENT
Start: 2023-07-06 | End: 2024-01-05

## 2023-07-06 RX ORDER — AMLODIPINE BESYLATE 10 MG/1
10 TABLET ORAL DAILY
Qty: 90 TABLET | Refills: 1 | Status: SHIPPED | OUTPATIENT
Start: 2023-07-06 | End: 2024-01-05

## 2023-07-06 RX ORDER — ATORVASTATIN CALCIUM 80 MG/1
80 TABLET, FILM COATED ORAL DAILY
Qty: 90 TABLET | Refills: 1 | Status: SHIPPED | OUTPATIENT
Start: 2023-07-06 | End: 2024-01-05

## 2023-07-06 RX ORDER — METOPROLOL SUCCINATE 50 MG/1
50 TABLET, EXTENDED RELEASE ORAL DAILY
Qty: 90 TABLET | Refills: 1 | Status: SHIPPED | OUTPATIENT
Start: 2023-07-06 | End: 2024-01-05

## 2023-07-06 RX ORDER — LISINOPRIL 40 MG/1
40 TABLET ORAL DAILY
Qty: 90 TABLET | Refills: 1 | Status: SHIPPED | OUTPATIENT
Start: 2023-07-06 | End: 2024-01-05

## 2023-07-06 ASSESSMENT — ENCOUNTER SYMPTOMS
ARTHRALGIAS: 0
WEAKNESS: 0
COUGH: 0
FREQUENCY: 0
DYSURIA: 0
NERVOUS/ANXIOUS: 0
DIZZINESS: 0
SHORTNESS OF BREATH: 0
MYALGIAS: 0
FEVER: 0
NAUSEA: 0
JOINT SWELLING: 0
CHILLS: 0
SORE THROAT: 0
CONSTIPATION: 0
EYE PAIN: 0
PARESTHESIAS: 0
DIARRHEA: 0
PALPITATIONS: 0
HEMATURIA: 0
HEADACHES: 0
ABDOMINAL PAIN: 0
HEARTBURN: 0
HEMATOCHEZIA: 0

## 2023-07-06 NOTE — PROGRESS NOTES
SUBJECTIVE:   CC: Jamel is an 53 year old who presents for preventative health visit.       7/6/2023     8:07 AM   Additional Questions   Roomed by Yehuda AARON CMA     Healthy Habits:     Getting at least 3 servings of Calcium per day:  Yes    Bi-annual eye exam:  Yes    Dental care twice a year:  Yes    Sleep apnea or symptoms of sleep apnea:  None    Diet:  Low salt    Frequency of exercise:  4-5 days/week    Duration of exercise:  45-60 minutes    Taking medications regularly:  No    Barriers to taking medications:  None    Additional concerns today:  No        Today's PHQ-2 Score:       7/5/2023    11:48 AM   PHQ-2 ( 1999 [employer])   Q1: Little interest or pleasure in doing things 0   Q2: Feeling down, depressed or hopeless 0   PHQ-2 Score 0   Q1: Little interest or pleasure in doing things Not at all   Q2: Feeling down, depressed or hopeless Not at all   PHQ-2 Score 0           Hyperlipidemia Follow-Up      Are you regularly taking any medication or supplement to lower your cholesterol?   Yes- statin    Are you having muscle aches or other side effects that you think could be caused by your cholesterol lowering medication?  No    Vascular Disease Follow-up      How often do you take nitroglycerin? Never    Do you take an aspirin every day? No        Social History     Tobacco Use     Smoking status: Never     Smokeless tobacco: Never   Substance Use Topics     Alcohol use: Not Currently     Comment: 1x week           7/5/2023    11:47 AM   Alcohol Use   Prescreen: >3 drinks/day or >7 drinks/week? No       Last PSA:   Prostate Specific Antigen Screen   Date Value Ref Range Status   09/23/2021 0.74 0.00 - 4.00 ug/L Final       Reviewed orders with patient. Reviewed health maintenance and updated orders accordingly - Yes  BP Readings from Last 3 Encounters:   07/06/23 130/80   01/25/23 138/78   01/13/23 110/73    Wt Readings from Last 3 Encounters:   07/06/23 92.5 kg (204 lb)   01/25/23 93.9 kg (207 lb)   01/13/23 95.6  kg (210 lb 12.8 oz)                  Patient Active Problem List   Diagnosis     Hypertensive emergency     Hypertensive urgency     LVH (left ventricular hypertrophy)     Adenomatous polyp of colon     Essential hypertension     Coronary artery disease involving native coronary artery of native heart without angina pectoris     Mixed hyperlipidemia     First degree AV block     Past Surgical History:   Procedure Laterality Date     ORTHOPEDIC SURGERY         Social History     Tobacco Use     Smoking status: Never     Smokeless tobacco: Never   Substance Use Topics     Alcohol use: Not Currently     Comment: 1x week     Family History   Family history unknown: Yes         Current Outpatient Medications   Medication Sig Dispense Refill     amLODIPine (NORVASC) 10 MG tablet Take 1 tablet (10 mg) by mouth daily 90 tablet 1     ASPIRIN NOT PRESCRIBED (INTENTIONAL) Please choose reason not prescribed from choices below.       atorvastatin (LIPITOR) 80 MG tablet Take 1 tablet (80 mg) by mouth daily 90 tablet 1     chlorthalidone (HYGROTON) 25 MG tablet Take 1 tablet (25 mg) by mouth daily 90 tablet 1     ferrous fumarate 65 mg, [place]. FE,-Vitamin C 125 mg (VITRON C)  MG TABS tablet Take 1 tablet by mouth daily       lisinopril (ZESTRIL) 40 MG tablet Take 1 tablet (40 mg) by mouth daily 90 tablet 1     metoprolol succinate ER (TOPROL XL) 50 MG 24 hr tablet Take 1 tablet (50 mg) by mouth daily 90 tablet 1     multivitamin w/minerals (THERA-VIT-M) tablet TAKE ONE TABLET BY MOUTH ONCE DAILY       Allergies   Allergen Reactions     Shellfish Allergy Anaphylaxis     Bicillin C-R,      Other reaction(s): *Unknown     Penicillins      Recent Labs   Lab Test 02/14/23  1255 01/25/23  0811 01/10/23  2153 01/09/23  0841 06/08/22  1334 03/01/22  1411 09/23/21  0935 10/08/20  1655   LDL  --   --   --   --  109*  --  164*  --    HDL  --   --   --   --  47  --  50  --    TRIG  --   --   --   --  73  --  98  --    ALT  --   --   --   " --  36  --   --  32   CR 1.17 1.30* 1.14   < >  --    < > 1.05 1.19   GFRESTIMATED 75 66 77   < >  --    < > 82 71   GFRESTBLACK  --   --   --   --   --   --   --  82   POTASSIUM  --  3.9 4.0   < >  --    < > 3.7 3.4   TSH  --   --   --   --  1.01  --   --  1.31    < > = values in this interval not displayed.        Reviewed and updated as needed this visit by clinical staff   Tobacco  Allergies  Meds              Reviewed and updated as needed this visit by Provider                 Past Medical History:   Diagnosis Date     Coronary artery disease involving native coronary artery of native heart without angina pectoris 01/09/2023     Hypertension 03/2022    Im not sure, need to check history      Past Surgical History:   Procedure Laterality Date     ORTHOPEDIC SURGERY         Review of Systems   Constitutional: Negative for chills and fever.   HENT: Negative for congestion, ear pain, hearing loss and sore throat.    Eyes: Negative for pain and visual disturbance.   Respiratory: Negative for cough and shortness of breath.    Cardiovascular: Negative for chest pain, palpitations and peripheral edema.   Gastrointestinal: Negative for abdominal pain, constipation, diarrhea, heartburn, hematochezia and nausea.   Genitourinary: Negative for dysuria, frequency, genital sores, hematuria, impotence, penile discharge and urgency.   Musculoskeletal: Negative for arthralgias, joint swelling and myalgias.   Neurological: Negative for dizziness, weakness, headaches and paresthesias.   Psychiatric/Behavioral: Negative for mood changes. The patient is not nervous/anxious.          OBJECTIVE:   /80 (BP Location: Right arm, Patient Position: Sitting, Cuff Size: Adult Regular)   Pulse 66   Temp 98.2  F (36.8  C) (Oral)   Resp 15   Ht 1.867 m (6' 1.5\")   Wt 92.5 kg (204 lb)   SpO2 98%   BMI 26.55 kg/m      Physical Exam  GENERAL: healthy, alert and no distress  EYES: Eyes grossly normal to inspection, PERRL and " conjunctivae and sclerae normal  HENT: ear canals and TM's normal, nose and mouth without ulcers or lesions  NECK: no adenopathy, no asymmetry, masses, or scars and thyroid normal to palpation  RESP: lungs clear to auscultation - no rales, rhonchi or wheezes  CV: regular rate and rhythm, normal S1 S2, no S3 or S4, no murmur, click or rub, no peripheral edema and peripheral pulses strong  ABDOMEN: soft, nontender, no hepatosplenomegaly, no masses and bowel sounds normal  MS: no gross musculoskeletal defects noted, no edema  SKIN: no suspicious lesions or rashes  NEURO: Normal strength and tone, mentation intact and speech normal  PSYCH: mentation appears normal, affect normal/bright  LYMPH: no cervical adenopathy    Diagnostic Test Results:  none     ASSESSMENT/PLAN:   (Z00.00) Routine general medical examination at a health care facility  (primary encounter diagnosis)  Comment: stable exam. Labs pending. If stable, recheck 6 months. May be virtual.   Plan:     (Z12.11) Screen for colon cancer  Comment: due in October.   Plan: Colonoscopy Screening  Referral            (I10) Essential hypertension  Comment: stable. Continue current regimen. If labs stable, recheck 6 months.   Plan: amLODIPine (NORVASC) 10 MG tablet,         chlorthalidone (HYGROTON) 25 MG tablet,         lisinopril (ZESTRIL) 40 MG tablet, metoprolol         succinate ER (TOPROL XL) 50 MG 24 hr tablet        Medication use and side effects discussed with the patient. Patient is in complete understanding and agreement with plan.       (I25.10) Coronary artery disease involving native coronary artery of native heart without angina pectoris  Comment: past history. ldl was 109 in past. Recheck today and if above 70, consider changing to crestor 40 mg.   Plan: atorvastatin (LIPITOR) 80 MG tablet        Medication use and side effects discussed with the patient. Patient is in complete understanding and agreement with plan.       (E78.2) Mixed  "hyperlipidemia  Comment: as above.   Plan: atorvastatin (LIPITOR) 80 MG tablet,         Comprehensive metabolic panel (BMP + Alb, Alk         Phos, ALT, AST, Total. Bili, TP), Lipid panel         reflex to direct LDL Fasting        Medication use and side effects discussed with the patient. Patient is in complete understanding and agreement with plan.       (Z12.5) Screening for prostate cancer  Comment: usptf recommendations discussed. Patient elects to continue.   Plan: PSA, screen              Patient has been advised of split billing requirements and indicates understanding: Yes      COUNSELING:   Reviewed preventive health counseling, as reflected in patient instructions       Regular exercise       Healthy diet/nutrition       Immunizations    Vaccinated for: Hepatitis B and Pneumococcal      Holding off on zoster due to cost.            Colorectal cancer screening       Prostate cancer screening      BMI:   Estimated body mass index is 26.55 kg/m  as calculated from the following:    Height as of this encounter: 1.867 m (6' 1.5\").    Weight as of this encounter: 92.5 kg (204 lb).         He reports that he has never smoked. He has never used smokeless tobacco.      Tesfaye Contreras PA-C  [facility]"

## 2023-07-24 ENCOUNTER — TRANSFERRED RECORDS (OUTPATIENT)
Dept: HEALTH INFORMATION MANAGEMENT | Facility: CLINIC | Age: 53
End: 2023-07-24
Payer: COMMERCIAL

## 2023-09-08 ENCOUNTER — PATIENT OUTREACH (OUTPATIENT)
Dept: FAMILY MEDICINE | Facility: CLINIC | Age: 53
End: 2023-09-08
Payer: COMMERCIAL

## 2023-09-08 NOTE — TELEPHONE ENCOUNTER
Patient Quality Outreach Health Maintenance - PAL RN    Summary:    PAL RN contacted pt regarding overdue health maintenance    Patient is due/failing the following:   Colon Cancer Screening      Topic Date Due    Zoster (Shingles) Vaccine (1 of 2) Never done    Flu Vaccine (1) 09/01/2023    Hepatitis B Vaccine (3 of 3 - 19+ 3-dose series) 08/31/2023       Health Maintenance Due   Topic Date Due    COLORECTAL CANCER SCREENING  Never done    ZOSTER IMMUNIZATION (1 of 2) Never done    INFLUENZA VACCINE (1) 09/01/2023    HEPATITIS B IMMUNIZATION (3 of 3 - 19+ 3-dose series) 08/31/2023       Type of outreach:    Phone, spoke to patient/parent. Scheduled pt for a lab only appointment to address Hgb A1c lab.     Sent [employer] message to call and schedule colon cancer screening.     - Advised patient if any questions or concerns comes up to call the PAL RN.   - Patient given opportunity to ask questions and at this time there is nothing further needed.     Questions for provider review:    None                                                                                     Chart routed to WENDY.    LUL Hansen (Patient Advocate Liaison)  [facility]  [phone]

## 2023-09-18 ENCOUNTER — LAB (OUTPATIENT)
Dept: LAB | Facility: CLINIC | Age: 53
End: 2023-09-18
Payer: COMMERCIAL

## 2023-09-18 DIAGNOSIS — R73.09 ELEVATED GLUCOSE: ICD-10-CM

## 2023-09-18 LAB — HBA1C MFR BLD: 5.9 % (ref 0–5.6)

## 2023-09-18 PROCEDURE — 36415 COLL VENOUS BLD VENIPUNCTURE: CPT

## 2023-09-18 PROCEDURE — 83036 HEMOGLOBIN GLYCOSYLATED A1C: CPT

## 2023-12-11 ENCOUNTER — TRANSFERRED RECORDS (OUTPATIENT)
Dept: HEALTH INFORMATION MANAGEMENT | Facility: CLINIC | Age: 53
End: 2023-12-11
Payer: COMMERCIAL

## 2023-12-12 ENCOUNTER — PATIENT OUTREACH (OUTPATIENT)
Dept: FAMILY MEDICINE | Facility: CLINIC | Age: 53
End: 2023-12-12
Payer: COMMERCIAL

## 2023-12-12 NOTE — TELEPHONE ENCOUNTER
Pt calling to request an extension for parking pass. It was previously ordered through [facility] and they referred pt to have obtained through PCP. He is requesting a visit to discuss.     -Assisted pt in scheduling a virtual visit with PCP on 12/15 at 10:45 AM.     Pt verbalizes understanding and is agreeable with appointment time and date. He denies any further questions or concerns at this time.     Eileen SYED RN   PAL (Patient Advocate Liaison)  [facility]  [phone]

## 2023-12-12 NOTE — TELEPHONE ENCOUNTER
Patient Quality Outreach Health Maintenance - PAL RN    Summary:    PAL RN contacted pt regarding overdue health maintenance    Patient is due/failing the following:   Colon Cancer Screening      Topic Date Due    Polio Vaccine (2 of 3 - Adult catch-up series) 12/08/1994    Zoster (Shingles) Vaccine (1 of 2) Never done    Hepatitis B Vaccine (3 of 3 - 19+ 3-dose series) 08/31/2023       Health Maintenance Due   Topic Date Due    COLORECTAL CANCER SCREENING  Never done    IPV IMMUNIZATION (2 of 3 - Adult catch-up series) 12/08/1994    ZOSTER IMMUNIZATION (1 of 2) Never done    HEPATITIS B IMMUNIZATION (3 of 3 - 19+ 3-dose series) 08/31/2023    BMP  01/06/2024    ANNUAL REVIEW OF HM ORDERS  01/09/2024       Type of outreach:    Phone, spoke to patient/parent. Pt states that he had his colonoscopy completed on 12/11 and he will call to have vaccines completed at pharmacy.     - Advised patient if any questions or concerns comes up to call the PAL RN.   - Patient given opportunity to ask questions and at this time there is nothing further needed.     Questions for provider review:    None                                                                                     Chart routed to WENDY.    LUL Hansen (Patient Advocate Liaison)  [facility]  [phone]

## 2023-12-15 ENCOUNTER — VIRTUAL VISIT (OUTPATIENT)
Dept: FAMILY MEDICINE | Facility: CLINIC | Age: 53
End: 2023-12-15
Payer: COMMERCIAL

## 2023-12-15 DIAGNOSIS — M25.551 CHRONIC PAIN OF BOTH HIPS: ICD-10-CM

## 2023-12-15 DIAGNOSIS — Z96.643 S/P BILATERAL HIP REPLACEMENTS: Primary | ICD-10-CM

## 2023-12-15 DIAGNOSIS — M25.552 CHRONIC PAIN OF BOTH HIPS: ICD-10-CM

## 2023-12-15 DIAGNOSIS — M75.41 IMPINGEMENT SYNDROME OF SHOULDER REGION, RIGHT: ICD-10-CM

## 2023-12-15 DIAGNOSIS — G89.29 CHRONIC PAIN OF BOTH HIPS: ICD-10-CM

## 2023-12-15 PROCEDURE — 99213 OFFICE O/P EST LOW 20 MIN: CPT | Mod: VID | Performed by: PHYSICIAN ASSISTANT

## 2023-12-15 NOTE — PROGRESS NOTES
Jamel is a 53 year old who is being evaluated via a billable video visit.      How would you like to obtain your AVS? MyChart  If the video visit is dropped, the invitation should be resent by: Text to cell phone: [phone]  Will anyone else be joining your video visit? No          Assessment & Plan     S/P bilateral hip replacements  Ognoing pains despite surgery. Given known chronic hip pain history and  difficulty with ambulation, will renew his disability parking pass paperwork. Will renew on Monday once I return to clinic. Hopefully with surgery in the last year, this will continue to improve over the next 12 months, but if not, would recommend discussing with his surgeon on other options.     Chronic pain of both hips  As above     Impingement syndrome of shoulder region, right  Recommending returning to ortho to discuss.       Tesfaye Contreras PA-C  [facility]    Ezio Mccullough is a 53 year old, presenting for the following health issues:  Consult For (Discuss extension of parking. )        12/15/2023    10:55 AM   Additional Questions   Roomed by Ana Laura Goel       Patient has past history of bilateral hip arthritis. S/p bilateral Total hip replacement. Most recent was in the winter of 2023. However, patient  continues to have severe pain in bilateral hips/groins with sitting, which has made it difficult to get in and out of his vehicle as well as ambulate from his car to a desired location. Has had a disability parking pass in the past for his hips and was hopeful he would no longer need this after surgeries, but given ongoing symptoms, he is wondering if this can be renewed.     Also, past history of impingement syndrome of right shoulder. Had steroid injection through [facility] in July and this helped for the last ~5 months. However, symptoms have started to return. Per emr review, appears mild arthritis was noted on acromion process. He is wondering the next step in  "management.     HPI       Review of Systems   Constitutional, HEENT, cardiovascular, pulmonary, GI, , musculoskeletal, neuro, skin, endocrine and psych systems are negative, except as otherwise noted.      Objective    Vitals - Patient Reported  Height (Patient Reported): 186.7 cm (6' 1.5\")      Vitals:  No vitals were obtained today due to virtual visit.    Physical Exam   GENERAL: Healthy, alert and no distress  EYES: Eyes grossly normal to inspection.  No discharge or erythema, or obvious scleral/conjunctival abnormalities.  RESP: No audible wheeze, cough, or visible cyanosis.  No visible retractions or increased work of breathing.    SKIN: Visible skin clear. No significant rash, abnormal pigmentation or lesions.  NEURO: Cranial nerves grossly intact.  Mentation and speech appropriate for age.  PSYCH: Mentation appears normal, affect normal/bright, judgement and insight intact, normal speech and appearance well-groomed.          Video-Visit Details    Type of service:  Video Visit     Originating Location (pt. Location): Home    Distant Location (provider location):  Off-site  Platform used for Video Visit: Stefan      "

## 2023-12-18 ENCOUNTER — TELEPHONE (OUTPATIENT)
Dept: FAMILY MEDICINE | Facility: CLINIC | Age: 53
End: 2023-12-18
Payer: COMMERCIAL

## 2023-12-19 DIAGNOSIS — I10 ESSENTIAL HYPERTENSION: ICD-10-CM

## 2023-12-19 DIAGNOSIS — I25.10 CORONARY ARTERY DISEASE INVOLVING NATIVE CORONARY ARTERY OF NATIVE HEART WITHOUT ANGINA PECTORIS: ICD-10-CM

## 2023-12-19 DIAGNOSIS — E78.2 MIXED HYPERLIPIDEMIA: ICD-10-CM

## 2023-12-20 ENCOUNTER — TRANSFERRED RECORDS (OUTPATIENT)
Dept: HEALTH INFORMATION MANAGEMENT | Facility: CLINIC | Age: 53
End: 2023-12-20
Payer: COMMERCIAL

## 2023-12-20 RX ORDER — ATORVASTATIN CALCIUM 80 MG/1
80 TABLET, FILM COATED ORAL DAILY
Qty: 90 TABLET | Refills: 1 | OUTPATIENT
Start: 2023-12-20

## 2023-12-20 RX ORDER — LISINOPRIL 40 MG/1
40 TABLET ORAL DAILY
Qty: 90 TABLET | Refills: 1 | OUTPATIENT
Start: 2023-12-20

## 2023-12-27 ENCOUNTER — PATIENT OUTREACH (OUTPATIENT)
Dept: GASTROENTEROLOGY | Facility: CLINIC | Age: 53
End: 2023-12-27
Payer: COMMERCIAL

## 2024-01-05 ENCOUNTER — MYC REFILL (OUTPATIENT)
Dept: FAMILY MEDICINE | Facility: CLINIC | Age: 54
End: 2024-01-05
Payer: COMMERCIAL

## 2024-01-05 DIAGNOSIS — E78.2 MIXED HYPERLIPIDEMIA: ICD-10-CM

## 2024-01-05 DIAGNOSIS — I25.10 CORONARY ARTERY DISEASE INVOLVING NATIVE CORONARY ARTERY OF NATIVE HEART WITHOUT ANGINA PECTORIS: ICD-10-CM

## 2024-01-05 DIAGNOSIS — I10 ESSENTIAL HYPERTENSION: ICD-10-CM

## 2024-01-05 RX ORDER — CHLORTHALIDONE 25 MG/1
25 TABLET ORAL DAILY
Qty: 90 TABLET | Refills: 1 | Status: SHIPPED | OUTPATIENT
Start: 2024-01-05 | End: 2024-06-18

## 2024-01-05 RX ORDER — LISINOPRIL 40 MG/1
40 TABLET ORAL DAILY
Qty: 90 TABLET | Refills: 1 | Status: SHIPPED | OUTPATIENT
Start: 2024-01-05 | End: 2024-06-10

## 2024-01-05 RX ORDER — ATORVASTATIN CALCIUM 80 MG/1
80 TABLET, FILM COATED ORAL DAILY
Qty: 90 TABLET | Refills: 1 | Status: SHIPPED | OUTPATIENT
Start: 2024-01-05 | End: 2024-06-10

## 2024-01-05 RX ORDER — MULTIPLE VITAMINS W/ MINERALS TAB 9MG-400MCG
1 TAB ORAL DAILY
OUTPATIENT
Start: 2024-01-05

## 2024-01-05 RX ORDER — AMLODIPINE BESYLATE 10 MG/1
10 TABLET ORAL DAILY
Qty: 90 TABLET | Refills: 1 | Status: SHIPPED | OUTPATIENT
Start: 2024-01-05 | End: 2024-06-18

## 2024-01-05 RX ORDER — METOPROLOL SUCCINATE 50 MG/1
50 TABLET, EXTENDED RELEASE ORAL DAILY
Qty: 90 TABLET | Refills: 1 | Status: SHIPPED | OUTPATIENT
Start: 2024-01-05 | End: 2024-06-18

## 2024-03-15 ENCOUNTER — MYC REFILL (OUTPATIENT)
Dept: FAMILY MEDICINE | Facility: CLINIC | Age: 54
End: 2024-03-15
Payer: COMMERCIAL

## 2024-03-15 DIAGNOSIS — I10 ESSENTIAL HYPERTENSION: ICD-10-CM

## 2024-03-15 DIAGNOSIS — I25.10 CORONARY ARTERY DISEASE INVOLVING NATIVE CORONARY ARTERY OF NATIVE HEART WITHOUT ANGINA PECTORIS: ICD-10-CM

## 2024-03-15 DIAGNOSIS — E78.2 MIXED HYPERLIPIDEMIA: ICD-10-CM

## 2024-03-15 RX ORDER — CHLORTHALIDONE 25 MG/1
25 TABLET ORAL DAILY
Qty: 90 TABLET | Refills: 1 | OUTPATIENT
Start: 2024-03-15

## 2024-03-15 RX ORDER — ATORVASTATIN CALCIUM 80 MG/1
80 TABLET, FILM COATED ORAL DAILY
Qty: 90 TABLET | Refills: 1 | OUTPATIENT
Start: 2024-03-15

## 2024-03-15 RX ORDER — LISINOPRIL 40 MG/1
40 TABLET ORAL DAILY
Qty: 90 TABLET | Refills: 1 | OUTPATIENT
Start: 2024-03-15

## 2024-03-15 RX ORDER — METOPROLOL SUCCINATE 50 MG/1
50 TABLET, EXTENDED RELEASE ORAL DAILY
Qty: 90 TABLET | Refills: 1 | OUTPATIENT
Start: 2024-03-15

## 2024-03-15 RX ORDER — AMLODIPINE BESYLATE 10 MG/1
10 TABLET ORAL DAILY
Qty: 90 TABLET | Refills: 1 | OUTPATIENT
Start: 2024-03-15

## 2024-03-15 NOTE — TELEPHONE ENCOUNTER
Denied.  Is never been ordered in our system.  Historically placed on med list.  Possibly purchased over-the-counter.    Bora Contreras PA-C

## 2024-03-20 NOTE — TELEPHONE ENCOUNTER
Pt calls, informed has refills at the pharmacy, pt agrees to follow up with the pharmacy  Cathy Paiz RN, BSN  [facility]

## 2024-03-25 ENCOUNTER — PATIENT OUTREACH (OUTPATIENT)
Dept: FAMILY MEDICINE | Facility: CLINIC | Age: 54
End: 2024-03-25
Payer: COMMERCIAL

## 2024-03-25 ENCOUNTER — MYC MEDICAL ADVICE (OUTPATIENT)
Dept: FAMILY MEDICINE | Facility: CLINIC | Age: 54
End: 2024-03-25
Payer: COMMERCIAL

## 2024-03-25 NOTE — TELEPHONE ENCOUNTER
Called and spoke with pt,     -He states his insurance is ending as of 3/31. He would like to have vaccines and lab completed prior.   -Offered pt care coordination- He states his spouse is working on insurance now and if help is needed, he can call back.   -Instructed pt to call insurance to verify whether vaccines can be completed in clinic or through pharmacy.     Assisted pt in scheduling a lab only on 3/26 for lab draw at 9:00 AM.     Pt verbalizes understanding, is agreeable with above, appointment time and date. He denies any further questions or concerns at this time.     Eileen SYED RN   PAL (Patient Advocate Liaison)  [facility]  [phone]

## 2024-03-25 NOTE — TELEPHONE ENCOUNTER
Patient Quality Outreach Health Maintenance - PAL RN    Summary:    PAL RN contacted pt regarding overdue health maintenance    Patient is due/failing the following:   Depression  -  PHQ-A needed      Topic Date Due    Polio Vaccine (2 of 3 - Adult catch-up series) 12/08/1994    Zoster (Shingles) Vaccine (2 of 2) 02/23/2024       Health Maintenance Due   Topic Date Due    IPV IMMUNIZATION (2 of 3 - Adult catch-up series) 12/08/1994    PHQ-2 (once per calendar year)  01/01/2024    BMP  01/06/2024    ZOSTER IMMUNIZATION (2 of 2) 02/23/2024       Type of outreach:    Sent [employer] message.    - Advised patient if any questions or concerns comes up to call the PAL RN.       Questions for provider review:    None                                                                                     Chart routed to WENDY.    LUL Hansen (Patient Advocate Liaison)  [facility]  [phone]

## 2024-03-26 ENCOUNTER — ALLIED HEALTH/NURSE VISIT (OUTPATIENT)
Dept: FAMILY MEDICINE | Facility: CLINIC | Age: 54
End: 2024-03-26
Payer: COMMERCIAL

## 2024-03-26 ENCOUNTER — LAB (OUTPATIENT)
Dept: LAB | Facility: CLINIC | Age: 54
End: 2024-03-26
Payer: COMMERCIAL

## 2024-03-26 DIAGNOSIS — Z23 POLIO VACCINE NEEDED: Primary | ICD-10-CM

## 2024-03-26 DIAGNOSIS — I10 ESSENTIAL HYPERTENSION: Primary | ICD-10-CM

## 2024-03-26 LAB
ANION GAP SERPL CALCULATED.3IONS-SCNC: 11 MMOL/L (ref 7–15)
BUN SERPL-MCNC: 23.1 MG/DL (ref 6–20)
CALCIUM SERPL-MCNC: 10 MG/DL (ref 8.6–10)
CHLORIDE SERPL-SCNC: 99 MMOL/L (ref 98–107)
CREAT SERPL-MCNC: 1.12 MG/DL (ref 0.67–1.17)
DEPRECATED HCO3 PLAS-SCNC: 27 MMOL/L (ref 22–29)
EGFRCR SERPLBLD CKD-EPI 2021: 78 ML/MIN/1.73M2
GLUCOSE SERPL-MCNC: 109 MG/DL (ref 70–99)
POTASSIUM SERPL-SCNC: 3.7 MMOL/L (ref 3.4–5.3)
SODIUM SERPL-SCNC: 137 MMOL/L (ref 135–145)

## 2024-03-26 PROCEDURE — 80048 BASIC METABOLIC PNL TOTAL CA: CPT

## 2024-03-26 PROCEDURE — 90750 HZV VACC RECOMBINANT IM: CPT

## 2024-03-26 PROCEDURE — 90713 POLIOVIRUS IPV SC/IM: CPT

## 2024-03-26 PROCEDURE — 99207 PR NO CHARGE NURSE ONLY: CPT

## 2024-03-26 PROCEDURE — 90472 IMMUNIZATION ADMIN EACH ADD: CPT

## 2024-03-26 PROCEDURE — 90471 IMMUNIZATION ADMIN: CPT

## 2024-03-26 PROCEDURE — 36415 COLL VENOUS BLD VENIPUNCTURE: CPT

## 2024-03-26 NOTE — PROGRESS NOTES
Prior to immunization administration, verified patients identity using patient s name and date of birth. Please see Immunization Activity for additional information.     Screening Questionnaire for Adult Immunization    Are you sick today?   No   Do you have allergies to medications, food, a vaccine component or latex?   No   Have you ever had a serious reaction after receiving a vaccination?   No   Do you have a long-term health problem with heart, lung, kidney, or metabolic disease (e.g., diabetes), asthma, a blood disorder, no spleen, complement component deficiency, a cochlear implant, or a spinal fluid leak?  Are you on long-term aspirin therapy?   No   Do you have cancer, leukemia, HIV/AIDS, or any other immune system problem?   No   Do you have a parent, brother, or sister with an immune system problem?   No   In the past 3 months, have you taken medications that affect  your immune system, such as prednisone, other steroids, or anticancer drugs; drugs for the treatment of rheumatoid arthritis, Crohn s disease, or psoriasis; or have you had radiation treatments?   No   Have you had a seizure, or a brain or other nervous system problem?   No   During the past year, have you received a transfusion of blood or blood    products, or been given immune (gamma) globulin or antiviral drug?   No   For women: Are you pregnant or is there a chance you could become       pregnant during the next month?   No   Have you received any vaccinations in the past 4 weeks?   No     Immunization questionnaire answers were all negative.    I have reviewed the following standing orders:   This patient is due and qualifies for the Zoster vaccine.  IPV IMMUNIZATION ALSO    Click here for Zoster Standing Order    I have reviewed the vaccines inclusion and exclusion criteria; No concerns regarding eligibility.     Patient instructed to remain in clinic for 15 minutes afterwards, and to report any adverse reactions.     Screening  performed by Denisha Beckford MA on 3/26/2024 at 9:20 AM.

## 2024-03-29 ENCOUNTER — TRANSFERRED RECORDS (OUTPATIENT)
Dept: HEALTH INFORMATION MANAGEMENT | Facility: CLINIC | Age: 54
End: 2024-03-29
Payer: COMMERCIAL

## 2024-06-09 DIAGNOSIS — E78.2 MIXED HYPERLIPIDEMIA: ICD-10-CM

## 2024-06-09 DIAGNOSIS — I10 ESSENTIAL HYPERTENSION: ICD-10-CM

## 2024-06-09 DIAGNOSIS — I25.10 CORONARY ARTERY DISEASE INVOLVING NATIVE CORONARY ARTERY OF NATIVE HEART WITHOUT ANGINA PECTORIS: ICD-10-CM

## 2024-06-10 RX ORDER — ATORVASTATIN CALCIUM 80 MG/1
80 TABLET, FILM COATED ORAL DAILY
Qty: 90 TABLET | Refills: 1 | Status: SHIPPED | OUTPATIENT
Start: 2024-06-10

## 2024-06-10 RX ORDER — LISINOPRIL 40 MG/1
40 TABLET ORAL DAILY
Qty: 90 TABLET | Refills: 1 | Status: SHIPPED | OUTPATIENT
Start: 2024-06-10

## 2024-06-17 DIAGNOSIS — I10 ESSENTIAL HYPERTENSION: ICD-10-CM

## 2024-06-18 RX ORDER — AMLODIPINE BESYLATE 10 MG/1
10 TABLET ORAL DAILY
Qty: 90 TABLET | Refills: 0 | Status: SHIPPED | OUTPATIENT
Start: 2024-06-18

## 2024-06-18 RX ORDER — CHLORTHALIDONE 25 MG/1
25 TABLET ORAL DAILY
Qty: 90 TABLET | Refills: 0 | Status: SHIPPED | OUTPATIENT
Start: 2024-06-18

## 2024-06-18 RX ORDER — METOPROLOL SUCCINATE 50 MG/1
50 TABLET, EXTENDED RELEASE ORAL DAILY
Qty: 90 TABLET | Refills: 0 | Status: SHIPPED | OUTPATIENT
Start: 2024-06-18

## 2024-08-24 ENCOUNTER — HEALTH MAINTENANCE LETTER (OUTPATIENT)
Age: 54
End: 2024-08-24

## 2024-10-15 ENCOUNTER — TRANSFERRED RECORDS (OUTPATIENT)
Dept: HEALTH INFORMATION MANAGEMENT | Facility: CLINIC | Age: 54
End: 2024-10-15

## 2025-03-04 ENCOUNTER — TRANSFERRED RECORDS (OUTPATIENT)
Dept: HEALTH INFORMATION MANAGEMENT | Facility: CLINIC | Age: 55
End: 2025-03-04